# Patient Record
Sex: MALE | Race: WHITE | NOT HISPANIC OR LATINO | Employment: FULL TIME | URBAN - METROPOLITAN AREA
[De-identification: names, ages, dates, MRNs, and addresses within clinical notes are randomized per-mention and may not be internally consistent; named-entity substitution may affect disease eponyms.]

---

## 2018-03-15 ENCOUNTER — APPOINTMENT (OUTPATIENT)
Dept: RADIOLOGY | Facility: MEDICAL CENTER | Age: 48
End: 2018-03-15
Attending: EMERGENCY MEDICINE
Payer: MEDICAID

## 2018-03-15 ENCOUNTER — HOSPITAL ENCOUNTER (EMERGENCY)
Facility: MEDICAL CENTER | Age: 48
End: 2018-03-15
Attending: EMERGENCY MEDICINE
Payer: MEDICAID

## 2018-03-15 VITALS
OXYGEN SATURATION: 94 % | SYSTOLIC BLOOD PRESSURE: 128 MMHG | HEIGHT: 75 IN | BODY MASS INDEX: 22.38 KG/M2 | WEIGHT: 180 LBS | HEART RATE: 65 BPM | TEMPERATURE: 97.3 F | RESPIRATION RATE: 16 BRPM | DIASTOLIC BLOOD PRESSURE: 87 MMHG
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
## 2018-03-15 DIAGNOSIS — R56.9 SEIZURE (HCC): ICD-10-CM

## 2018-03-15 DIAGNOSIS — S20.229A CONTUSION OF BACK, UNSPECIFIED LATERALITY, INITIAL ENCOUNTER: ICD-10-CM

## 2018-03-15 LAB
ABO GROUP BLD: NORMAL
ALBUMIN SERPL BCP-MCNC: 4.1 G/DL (ref 3.2–4.9)
ALBUMIN/GLOB SERPL: 1.6 G/DL
ALP SERPL-CCNC: 51 U/L (ref 30–99)
ALT SERPL-CCNC: 9 U/L (ref 2–50)
ANION GAP SERPL CALC-SCNC: 9 MMOL/L (ref 0–11.9)
APTT PPP: 23.2 SEC (ref 24.7–36)
AST SERPL-CCNC: 13 U/L (ref 12–45)
BILIRUB SERPL-MCNC: 0.4 MG/DL (ref 0.1–1.5)
BLD GP AB SCN SERPL QL: NORMAL
BUN SERPL-MCNC: 11 MG/DL (ref 8–22)
CALCIUM SERPL-MCNC: 9.1 MG/DL (ref 8.5–10.5)
CHLORIDE SERPL-SCNC: 106 MMOL/L (ref 96–112)
CO2 SERPL-SCNC: 22 MMOL/L (ref 20–33)
CREAT SERPL-MCNC: 1.12 MG/DL (ref 0.5–1.4)
ERYTHROCYTE [DISTWIDTH] IN BLOOD BY AUTOMATED COUNT: 48.1 FL (ref 35.9–50)
ETHANOL BLD-MCNC: 0.02 G/DL
GLOBULIN SER CALC-MCNC: 2.6 G/DL (ref 1.9–3.5)
GLUCOSE SERPL-MCNC: 153 MG/DL (ref 65–99)
HCT VFR BLD AUTO: 46.6 % (ref 42–52)
HGB BLD-MCNC: 15.7 G/DL (ref 14–18)
INR PPP: 0.92 (ref 0.87–1.13)
MCH RBC QN AUTO: 33.1 PG (ref 27–33)
MCHC RBC AUTO-ENTMCNC: 33.7 G/DL (ref 33.7–35.3)
MCV RBC AUTO: 98.3 FL (ref 81.4–97.8)
PLATELET # BLD AUTO: 247 K/UL (ref 164–446)
PMV BLD AUTO: 9.2 FL (ref 9–12.9)
POTASSIUM SERPL-SCNC: 3.7 MMOL/L (ref 3.6–5.5)
PROT SERPL-MCNC: 6.7 G/DL (ref 6–8.2)
PROTHROMBIN TIME: 12.1 SEC (ref 12–14.6)
RBC # BLD AUTO: 4.74 M/UL (ref 4.7–6.1)
RH BLD: NORMAL
SODIUM SERPL-SCNC: 137 MMOL/L (ref 135–145)
WBC # BLD AUTO: 9.2 K/UL (ref 4.8–10.8)

## 2018-03-15 PROCEDURE — 85730 THROMBOPLASTIN TIME PARTIAL: CPT

## 2018-03-15 PROCEDURE — 96374 THER/PROPH/DIAG INJ IV PUSH: CPT | Mod: XU

## 2018-03-15 PROCEDURE — 70551 MRI BRAIN STEM W/O DYE: CPT

## 2018-03-15 PROCEDURE — 85027 COMPLETE CBC AUTOMATED: CPT

## 2018-03-15 PROCEDURE — 71260 CT THORAX DX C+: CPT

## 2018-03-15 PROCEDURE — 80307 DRUG TEST PRSMV CHEM ANLYZR: CPT

## 2018-03-15 PROCEDURE — 72131 CT LUMBAR SPINE W/O DYE: CPT

## 2018-03-15 PROCEDURE — 93005 ELECTROCARDIOGRAM TRACING: CPT | Performed by: EMERGENCY MEDICINE

## 2018-03-15 PROCEDURE — 99285 EMERGENCY DEPT VISIT HI MDM: CPT

## 2018-03-15 PROCEDURE — 700111 HCHG RX REV CODE 636 W/ 250 OVERRIDE (IP): Performed by: EMERGENCY MEDICINE

## 2018-03-15 PROCEDURE — A9270 NON-COVERED ITEM OR SERVICE: HCPCS | Performed by: EMERGENCY MEDICINE

## 2018-03-15 PROCEDURE — 86900 BLOOD TYPING SEROLOGIC ABO: CPT

## 2018-03-15 PROCEDURE — 95951 EEG: CPT | Mod: 52

## 2018-03-15 PROCEDURE — 700102 HCHG RX REV CODE 250 W/ 637 OVERRIDE(OP): Performed by: EMERGENCY MEDICINE

## 2018-03-15 PROCEDURE — 72125 CT NECK SPINE W/O DYE: CPT

## 2018-03-15 PROCEDURE — 85610 PROTHROMBIN TIME: CPT

## 2018-03-15 PROCEDURE — 80053 COMPREHEN METABOLIC PANEL: CPT

## 2018-03-15 PROCEDURE — 700117 HCHG RX CONTRAST REV CODE 255: Performed by: EMERGENCY MEDICINE

## 2018-03-15 PROCEDURE — 86901 BLOOD TYPING SEROLOGIC RH(D): CPT

## 2018-03-15 PROCEDURE — 70450 CT HEAD/BRAIN W/O DYE: CPT

## 2018-03-15 PROCEDURE — 72128 CT CHEST SPINE W/O DYE: CPT

## 2018-03-15 PROCEDURE — 86850 RBC ANTIBODY SCREEN: CPT

## 2018-03-15 PROCEDURE — 305948 HCHG GREEN TRAUMA ACT PRE-NOTIFY NO CC

## 2018-03-15 RX ORDER — QUETIAPINE FUMARATE 200 MG/1
200 TABLET, FILM COATED ORAL
Status: ON HOLD | COMMUNITY
End: 2018-06-05

## 2018-03-15 RX ORDER — PHENYTOIN SODIUM 100 MG/1
300 CAPSULE, EXTENDED RELEASE ORAL DAILY
Qty: 90 CAP | Refills: 3 | Status: ON HOLD | OUTPATIENT
Start: 2018-03-15 | End: 2018-06-05

## 2018-03-15 RX ORDER — MORPHINE SULFATE 4 MG/ML
4 INJECTION, SOLUTION INTRAMUSCULAR; INTRAVENOUS ONCE
Status: COMPLETED | OUTPATIENT
Start: 2018-03-15 | End: 2018-03-15

## 2018-03-15 RX ORDER — LEVETIRACETAM 500 MG/1
500 TABLET ORAL 2 TIMES DAILY
Qty: 60 TAB | Refills: 3 | Status: ON HOLD | OUTPATIENT
Start: 2018-03-15 | End: 2018-06-05

## 2018-03-15 RX ORDER — NALTREXONE HYDROCHLORIDE 50 MG/1
50 TABLET, FILM COATED ORAL DAILY
Status: ON HOLD | COMMUNITY
End: 2018-06-05

## 2018-03-15 RX ORDER — GABAPENTIN 300 MG/1
300 CAPSULE ORAL 4 TIMES DAILY
Status: ON HOLD | COMMUNITY
End: 2018-06-05

## 2018-03-15 RX ORDER — LEVETIRACETAM 500 MG/1
500 TABLET ORAL ONCE
Status: COMPLETED | OUTPATIENT
Start: 2018-03-15 | End: 2018-03-15

## 2018-03-15 RX ADMIN — LEVETIRACETAM 500 MG: 500 TABLET, FILM COATED ORAL at 16:08

## 2018-03-15 RX ADMIN — IOHEXOL 100 ML: 350 INJECTION, SOLUTION INTRAVENOUS at 11:45

## 2018-03-15 RX ADMIN — MORPHINE SULFATE 4 MG: 4 INJECTION INTRAVENOUS at 15:21

## 2018-03-15 NOTE — DISCHARGE INSTRUCTIONS
Seizures     Return for back to back seizures or seizure longer than 10 minutes.  Return for ill appearance, fever or injury.  Followup with neurology.  Do not drive for 3 months until cleared by neurology.  During a seizure ease the patient to the floor, move objects away from him to prevent injury, put nothing in his mouth. Take ibuprofen and Tylenol for back pain follow-up with [facility] neurology and a primary physician through your insurance or at the [facility] clinic. Take Keppra if you can afford it or your insurance covers it. Otherwise take Dilantin. Do not take both.    You had a borderline or high normal blood pressure reading today.  This does not necessarily mean you have hypertension.  Please followup with your/a primary physician for comprehensive blood pressure evaluation and yearly fasting cholesterol assessment.  BP Readings from Last 3 Encounters:   03/15/18 128/87         You had a seizure.  About 2% of the population will have a seizure problem during their life.  Sometimes the cause for the seizure is not known.  Seizures are most often associated with one of these problems:  Ø Epilepsy.  Ø Not taking your seizure medicine.  Ø Alcohol and drug abuse.  Ø Head injury, strokes, tumors, and brain surgery.  Ø High fever and infections.  Ø Low blood sugar.     Evaluating a new seizure disorder may require having a brain scan or an EEG (brain wave test). If you have been given a seizure medicine, it is very important that you take it as prescribed.  Not taking these medicines as directed is the most common cause of seizures. Blood tests are often used to be sure you are taking the proper dose.      Seizures cause many different symptoms, from convulsions to brief blackouts. Please do not ride a bike, drive a car, go swimming, climb in high or dangerous places such as ladders or roofs, or operate any dangerous equipment until you have your doctor's permission.  If you hold a 's license, state law may  require that a report be made to the motor vehicles department.  You should wear an emergency medical identification bracelet with information about your seizures. If you have any warning that a seizure may occur, lie down in a safe place to protect yourself.     Teach your family and friends what to do if you have any further seizures. They should stay calm and try to keep you from falling on hard or sharp objects. It is best not to try to restrain a seizing person or to force anything into their mouth. Do not try to open clenched jaws.  When the seizure is over, the person should be rolled on their side to help drain any vomit or secretions from the mouth.  After a seizure the person may be confused or drowsy for several minutes and they can be reassured. An ambulance should be called if the seizure lasted more than 5 minutes or if confusion remains for more than 30 minutes.  Call your caregiver or the emergency department for further instructions.     Do not drive until cleared by your caregiver or neurologist!     Document Released: 01/25/2006  Document Re-Released: 10/14/2008  [employer]® Patient Information ©2009 [employer].

## 2018-03-15 NOTE — LETTER
[facility], EMERGENCY DEPT  [facility] [address]  [phone]     March 15, 2018    Patient: Andre Briceño   YOB: 1970   Date of Visit: 3/12/2018       To Whom It May Concern:    Andre Briceño was seen and treated in our department on 3/12/2018. He should be excused from work until 3/20/18.    Sincerely,     Aleajndro Martin R.N.

## 2018-03-15 NOTE — ED NOTES
Pt to MRI in [facility] with transport tech, pt sits at edge of bed voids 900mL clear yellow urine prior to leaving

## 2018-03-15 NOTE — ED NOTES
Pt's friends arrive at bedside, pt talking with wife on phone, pt's face cleaned with soap and water, eyes irrigated with water.
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
## 2018-03-15 NOTE — ED NOTES
PT gave permission to give info to mom and sister.  Mom and sister updated.  PT awake and alert at this time
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
## 2018-03-17 LAB — EKG IMPRESSION: NORMAL

## 2018-06-04 ENCOUNTER — APPOINTMENT (OUTPATIENT)
Dept: RADIOLOGY | Facility: MEDICAL CENTER | Age: 48
DRG: 137 | End: 2018-06-04
Attending: EMERGENCY MEDICINE
Payer: MEDICAID

## 2018-06-04 ENCOUNTER — HOSPITAL ENCOUNTER (INPATIENT)
Facility: MEDICAL CENTER | Age: 48
LOS: 2 days | DRG: 137 | End: 2018-06-06
Attending: EMERGENCY MEDICINE | Admitting: INTERNAL MEDICINE
Payer: MEDICAID

## 2018-06-04 DIAGNOSIS — K04.7 INFECTED DENTAL CARIES: ICD-10-CM

## 2018-06-04 DIAGNOSIS — K02.9 INFECTED DENTAL CARIES: ICD-10-CM

## 2018-06-04 LAB
ALBUMIN SERPL BCP-MCNC: 4.7 G/DL (ref 3.2–4.9)
ALBUMIN/GLOB SERPL: 1.5 G/DL
ALP SERPL-CCNC: 68 U/L (ref 30–99)
ALT SERPL-CCNC: 13 U/L (ref 2–50)
ANION GAP SERPL CALC-SCNC: 7 MMOL/L (ref 0–11.9)
APTT PPP: 28.5 SEC (ref 24.7–36)
AST SERPL-CCNC: 13 U/L (ref 12–45)
BASOPHILS # BLD AUTO: 0.3 % (ref 0–1.8)
BASOPHILS # BLD: 0.02 K/UL (ref 0–0.12)
BILIRUB SERPL-MCNC: 0.2 MG/DL (ref 0.1–1.5)
BUN SERPL-MCNC: 14 MG/DL (ref 8–22)
CALCIUM SERPL-MCNC: 9.4 MG/DL (ref 8.5–10.5)
CHLORIDE SERPL-SCNC: 105 MMOL/L (ref 96–112)
CO2 SERPL-SCNC: 28 MMOL/L (ref 20–33)
CREAT SERPL-MCNC: 0.99 MG/DL (ref 0.5–1.4)
EOSINOPHIL # BLD AUTO: 0.02 K/UL (ref 0–0.51)
EOSINOPHIL NFR BLD: 0.3 % (ref 0–6.9)
ERYTHROCYTE [DISTWIDTH] IN BLOOD BY AUTOMATED COUNT: 41.9 FL (ref 35.9–50)
GLOBULIN SER CALC-MCNC: 3.1 G/DL (ref 1.9–3.5)
GLUCOSE SERPL-MCNC: 104 MG/DL (ref 65–99)
HCT VFR BLD AUTO: 43.2 % (ref 42–52)
HGB BLD-MCNC: 14.9 G/DL (ref 14–18)
IMM GRANULOCYTES # BLD AUTO: 0.02 K/UL (ref 0–0.11)
IMM GRANULOCYTES NFR BLD AUTO: 0.3 % (ref 0–0.9)
INR PPP: 0.99 (ref 0.87–1.13)
LYMPHOCYTES # BLD AUTO: 1.61 K/UL (ref 1–4.8)
LYMPHOCYTES NFR BLD: 26.6 % (ref 22–41)
MCH RBC QN AUTO: 32.5 PG (ref 27–33)
MCHC RBC AUTO-ENTMCNC: 34.5 G/DL (ref 33.7–35.3)
MCV RBC AUTO: 94.3 FL (ref 81.4–97.8)
MONOCYTES # BLD AUTO: 0.57 K/UL (ref 0–0.85)
MONOCYTES NFR BLD AUTO: 9.4 % (ref 0–13.4)
NEUTROPHILS # BLD AUTO: 3.81 K/UL (ref 1.82–7.42)
NEUTROPHILS NFR BLD: 63.1 % (ref 44–72)
NRBC # BLD AUTO: 0 K/UL
NRBC BLD-RTO: 0 /100 WBC
PLATELET # BLD AUTO: 264 K/UL (ref 164–446)
PMV BLD AUTO: 9.3 FL (ref 9–12.9)
POTASSIUM SERPL-SCNC: 3.9 MMOL/L (ref 3.6–5.5)
PROT SERPL-MCNC: 7.8 G/DL (ref 6–8.2)
PROTHROMBIN TIME: 12.8 SEC (ref 12–14.6)
RBC # BLD AUTO: 4.58 M/UL (ref 4.7–6.1)
SODIUM SERPL-SCNC: 140 MMOL/L (ref 135–145)
WBC # BLD AUTO: 6.1 K/UL (ref 4.8–10.8)

## 2018-06-04 PROCEDURE — 85610 PROTHROMBIN TIME: CPT

## 2018-06-04 PROCEDURE — 700105 HCHG RX REV CODE 258: Performed by: EMERGENCY MEDICINE

## 2018-06-04 PROCEDURE — 85025 COMPLETE CBC W/AUTO DIFF WBC: CPT

## 2018-06-04 PROCEDURE — 85730 THROMBOPLASTIN TIME PARTIAL: CPT

## 2018-06-04 PROCEDURE — 99285 EMERGENCY DEPT VISIT HI MDM: CPT

## 2018-06-04 PROCEDURE — 70355 PANORAMIC X-RAY OF JAWS: CPT

## 2018-06-04 PROCEDURE — 96365 THER/PROPH/DIAG IV INF INIT: CPT

## 2018-06-04 PROCEDURE — 770006 HCHG ROOM/CARE - MED/SURG/GYN SEMI*

## 2018-06-04 PROCEDURE — 36415 COLL VENOUS BLD VENIPUNCTURE: CPT

## 2018-06-04 PROCEDURE — 80053 COMPREHEN METABOLIC PANEL: CPT

## 2018-06-04 PROCEDURE — 700111 HCHG RX REV CODE 636 W/ 250 OVERRIDE (IP): Performed by: EMERGENCY MEDICINE

## 2018-06-04 RX ORDER — QUETIAPINE FUMARATE 100 MG/1
200 TABLET, FILM COATED ORAL
Status: DISCONTINUED | OUTPATIENT
Start: 2018-06-04 | End: 2018-06-06 | Stop reason: HOSPADM

## 2018-06-04 RX ORDER — SODIUM CHLORIDE 9 MG/ML
INJECTION, SOLUTION INTRAVENOUS CONTINUOUS
Status: DISPENSED | OUTPATIENT
Start: 2018-06-04 | End: 2018-06-05

## 2018-06-04 RX ORDER — GABAPENTIN 100 MG/1
100 CAPSULE ORAL
Status: DISCONTINUED | OUTPATIENT
Start: 2018-06-05 | End: 2018-06-06 | Stop reason: HOSPADM

## 2018-06-04 RX ORDER — PHENYTOIN SODIUM 100 MG/1
300 CAPSULE, EXTENDED RELEASE ORAL DAILY
Status: DISCONTINUED | OUTPATIENT
Start: 2018-06-05 | End: 2018-06-06 | Stop reason: HOSPADM

## 2018-06-04 RX ORDER — POLYETHYLENE GLYCOL 3350 17 G/17G
1 POWDER, FOR SOLUTION ORAL
Status: DISCONTINUED | OUTPATIENT
Start: 2018-06-04 | End: 2018-06-06 | Stop reason: HOSPADM

## 2018-06-04 RX ORDER — QUETIAPINE FUMARATE 25 MG/1
50 TABLET, FILM COATED ORAL
Status: DISCONTINUED | OUTPATIENT
Start: 2018-06-05 | End: 2018-06-06 | Stop reason: HOSPADM

## 2018-06-04 RX ORDER — AMOXICILLIN 250 MG
2 CAPSULE ORAL 2 TIMES DAILY
Status: DISCONTINUED | OUTPATIENT
Start: 2018-06-04 | End: 2018-06-06 | Stop reason: HOSPADM

## 2018-06-04 RX ORDER — QUETIAPINE FUMARATE 25 MG/1
50 TABLET, FILM COATED ORAL EVERY MORNING
Status: DISCONTINUED | OUTPATIENT
Start: 2018-06-05 | End: 2018-06-06 | Stop reason: HOSPADM

## 2018-06-04 RX ORDER — IBUPROFEN 800 MG/1
800 TABLET ORAL EVERY 8 HOURS PRN
Status: DISCONTINUED | OUTPATIENT
Start: 2018-06-04 | End: 2018-06-06 | Stop reason: HOSPADM

## 2018-06-04 RX ORDER — BISACODYL 10 MG
10 SUPPOSITORY, RECTAL RECTAL
Status: DISCONTINUED | OUTPATIENT
Start: 2018-06-04 | End: 2018-06-06 | Stop reason: HOSPADM

## 2018-06-04 RX ORDER — IBUPROFEN 800 MG/1
800 TABLET ORAL EVERY 8 HOURS PRN
Status: ON HOLD | COMMUNITY
End: 2018-06-05

## 2018-06-04 RX ORDER — AMOXICILLIN 500 MG/1
500 CAPSULE ORAL 3 TIMES DAILY
Status: ON HOLD | COMMUNITY
End: 2018-06-05

## 2018-06-04 RX ORDER — NALTREXONE HYDROCHLORIDE 50 MG/1
50 TABLET, FILM COATED ORAL DAILY
Status: DISCONTINUED | OUTPATIENT
Start: 2018-06-05 | End: 2018-06-06 | Stop reason: HOSPADM

## 2018-06-04 RX ADMIN — SODIUM CHLORIDE 3 G: 900 INJECTION INTRAVENOUS at 22:05

## 2018-06-04 ASSESSMENT — LIFESTYLE VARIABLES: DO YOU DRINK ALCOHOL: NO

## 2018-06-04 ASSESSMENT — PAIN SCALES - GENERAL: PAINLEVEL_OUTOF10: 2

## 2018-06-05 PROBLEM — G40.909 SEIZURE DISORDER (HCC): Status: ACTIVE | Noted: 2018-06-05

## 2018-06-05 PROBLEM — F99 PSYCHIATRIC DISORDER: Status: ACTIVE | Noted: 2018-06-05

## 2018-06-05 PROBLEM — K04.7 DENTAL ABSCESS: Status: ACTIVE | Noted: 2018-06-05

## 2018-06-05 LAB
ALBUMIN SERPL BCP-MCNC: 3.9 G/DL (ref 3.2–4.9)
ALBUMIN/GLOB SERPL: 1.5 G/DL
ALP SERPL-CCNC: 59 U/L (ref 30–99)
ALT SERPL-CCNC: 11 U/L (ref 2–50)
ANION GAP SERPL CALC-SCNC: 7 MMOL/L (ref 0–11.9)
AST SERPL-CCNC: 12 U/L (ref 12–45)
BASOPHILS # BLD AUTO: 0.4 % (ref 0–1.8)
BASOPHILS # BLD: 0.02 K/UL (ref 0–0.12)
BILIRUB SERPL-MCNC: 0.2 MG/DL (ref 0.1–1.5)
BUN SERPL-MCNC: 14 MG/DL (ref 8–22)
CALCIUM SERPL-MCNC: 9 MG/DL (ref 8.5–10.5)
CHLORIDE SERPL-SCNC: 105 MMOL/L (ref 96–112)
CO2 SERPL-SCNC: 27 MMOL/L (ref 20–33)
CREAT SERPL-MCNC: 0.86 MG/DL (ref 0.5–1.4)
EOSINOPHIL # BLD AUTO: 0.06 K/UL (ref 0–0.51)
EOSINOPHIL NFR BLD: 1.2 % (ref 0–6.9)
ERYTHROCYTE [DISTWIDTH] IN BLOOD BY AUTOMATED COUNT: 41.1 FL (ref 35.9–50)
GLOBULIN SER CALC-MCNC: 2.6 G/DL (ref 1.9–3.5)
GLUCOSE SERPL-MCNC: 107 MG/DL (ref 65–99)
HCT VFR BLD AUTO: 37.7 % (ref 42–52)
HGB BLD-MCNC: 13.3 G/DL (ref 14–18)
IMM GRANULOCYTES # BLD AUTO: 0.01 K/UL (ref 0–0.11)
IMM GRANULOCYTES NFR BLD AUTO: 0.2 % (ref 0–0.9)
LYMPHOCYTES # BLD AUTO: 1.82 K/UL (ref 1–4.8)
LYMPHOCYTES NFR BLD: 35.8 % (ref 22–41)
MCH RBC QN AUTO: 33 PG (ref 27–33)
MCHC RBC AUTO-ENTMCNC: 35.3 G/DL (ref 33.7–35.3)
MCV RBC AUTO: 93.5 FL (ref 81.4–97.8)
MONOCYTES # BLD AUTO: 0.75 K/UL (ref 0–0.85)
MONOCYTES NFR BLD AUTO: 14.8 % (ref 0–13.4)
NEUTROPHILS # BLD AUTO: 2.42 K/UL (ref 1.82–7.42)
NEUTROPHILS NFR BLD: 47.6 % (ref 44–72)
NRBC # BLD AUTO: 0 K/UL
NRBC BLD-RTO: 0 /100 WBC
PLATELET # BLD AUTO: 235 K/UL (ref 164–446)
PMV BLD AUTO: 9.4 FL (ref 9–12.9)
POTASSIUM SERPL-SCNC: 4 MMOL/L (ref 3.6–5.5)
PROT SERPL-MCNC: 6.5 G/DL (ref 6–8.2)
RBC # BLD AUTO: 4.03 M/UL (ref 4.7–6.1)
SODIUM SERPL-SCNC: 139 MMOL/L (ref 135–145)
WBC # BLD AUTO: 5.1 K/UL (ref 4.8–10.8)

## 2018-06-05 PROCEDURE — 0CDXXZ1 EXTRACTION OF LOWER TOOTH, MULTIPLE, EXTERNAL APPROACH: ICD-10-PCS | Performed by: DENTIST

## 2018-06-05 PROCEDURE — 87040 BLOOD CULTURE FOR BACTERIA: CPT

## 2018-06-05 PROCEDURE — 700101 HCHG RX REV CODE 250

## 2018-06-05 PROCEDURE — 700111 HCHG RX REV CODE 636 W/ 250 OVERRIDE (IP): Performed by: STUDENT IN AN ORGANIZED HEALTH CARE EDUCATION/TRAINING PROGRAM

## 2018-06-05 PROCEDURE — 500380 HCHG DRAIN, PENROSE 1/4X12: Performed by: DENTIST

## 2018-06-05 PROCEDURE — 160038 HCHG SURGERY MINUTES - EA ADDL 1 MIN LEVEL 2: Performed by: DENTIST

## 2018-06-05 PROCEDURE — A6403 STERILE GAUZE>16 <= 48 SQ IN: HCPCS | Performed by: DENTIST

## 2018-06-05 PROCEDURE — 700111 HCHG RX REV CODE 636 W/ 250 OVERRIDE (IP)

## 2018-06-05 PROCEDURE — 160027 HCHG SURGERY MINUTES - 1ST 30 MINS LEVEL 2: Performed by: DENTIST

## 2018-06-05 PROCEDURE — 36415 COLL VENOUS BLD VENIPUNCTURE: CPT

## 2018-06-05 PROCEDURE — 160035 HCHG PACU - 1ST 60 MINS PHASE I: Performed by: DENTIST

## 2018-06-05 PROCEDURE — 0W930ZZ DRAINAGE OF ORAL CAVITY AND THROAT, OPEN APPROACH: ICD-10-PCS | Performed by: DENTIST

## 2018-06-05 PROCEDURE — 700105 HCHG RX REV CODE 258

## 2018-06-05 PROCEDURE — 85025 COMPLETE CBC W/AUTO DIFF WBC: CPT

## 2018-06-05 PROCEDURE — 700105 HCHG RX REV CODE 258: Performed by: STUDENT IN AN ORGANIZED HEALTH CARE EDUCATION/TRAINING PROGRAM

## 2018-06-05 PROCEDURE — 500126 HCHG BOVIE, NEEDLE TIP: Performed by: DENTIST

## 2018-06-05 PROCEDURE — 500754 HCHG JAW BRA: Performed by: DENTIST

## 2018-06-05 PROCEDURE — 80053 COMPREHEN METABOLIC PANEL: CPT

## 2018-06-05 PROCEDURE — 500445 HCHG HEMOSTAT, SURGICEL 4X8: Performed by: DENTIST

## 2018-06-05 PROCEDURE — 160009 HCHG ANES TIME/MIN: Performed by: DENTIST

## 2018-06-05 PROCEDURE — 770006 HCHG ROOM/CARE - MED/SURG/GYN SEMI*

## 2018-06-05 PROCEDURE — 160002 HCHG RECOVERY MINUTES (STAT): Performed by: DENTIST

## 2018-06-05 PROCEDURE — 501411 HCHG SPONGE, BABY LAP W/O RINGS: Performed by: DENTIST

## 2018-06-05 PROCEDURE — 500423 HCHG DRESSING, ABD COMBINE: Performed by: DENTIST

## 2018-06-05 PROCEDURE — A9270 NON-COVERED ITEM OR SERVICE: HCPCS | Performed by: STUDENT IN AN ORGANIZED HEALTH CARE EDUCATION/TRAINING PROGRAM

## 2018-06-05 PROCEDURE — 160048 HCHG OR STATISTICAL LEVEL 1-5: Performed by: DENTIST

## 2018-06-05 PROCEDURE — 500440 HCHG DRESSING, STERILE ROLL (KERLIX): Performed by: DENTIST

## 2018-06-05 PROCEDURE — 700102 HCHG RX REV CODE 250 W/ 637 OVERRIDE(OP): Performed by: STUDENT IN AN ORGANIZED HEALTH CARE EDUCATION/TRAINING PROGRAM

## 2018-06-05 PROCEDURE — 99223 1ST HOSP IP/OBS HIGH 75: CPT | Mod: GC | Performed by: INTERNAL MEDICINE

## 2018-06-05 PROCEDURE — 501838 HCHG SUTURE GENERAL: Performed by: DENTIST

## 2018-06-05 RX ORDER — LIDOCAINE HYDROCHLORIDE AND EPINEPHRINE BITARTRATE 20; .01 MG/ML; MG/ML
INJECTION, SOLUTION SUBCUTANEOUS
Status: DISCONTINUED | OUTPATIENT
Start: 2018-06-05 | End: 2018-06-05 | Stop reason: HOSPADM

## 2018-06-05 RX ORDER — QUETIAPINE FUMARATE 400 MG/1
400 TABLET, FILM COATED ORAL EVERY EVENING
COMMUNITY

## 2018-06-05 RX ORDER — QUETIAPINE FUMARATE 50 MG/1
50 TABLET, FILM COATED ORAL 2 TIMES DAILY
COMMUNITY

## 2018-06-05 RX ORDER — AMOXICILLIN 500 MG/1
500 CAPSULE ORAL 2 TIMES DAILY
Status: ON HOLD | COMMUNITY
Start: 2018-05-16 | End: 2018-06-06

## 2018-06-05 RX ORDER — PHENYTOIN SODIUM 100 MG/1
300 CAPSULE, EXTENDED RELEASE ORAL EVERY EVENING
COMMUNITY
End: 2019-07-22

## 2018-06-05 RX ORDER — LEVETIRACETAM 500 MG/1
500 TABLET ORAL 2 TIMES DAILY
Status: DISCONTINUED | OUTPATIENT
Start: 2018-06-05 | End: 2018-06-05

## 2018-06-05 RX ORDER — GABAPENTIN 300 MG/1
300 CAPSULE ORAL 4 TIMES DAILY
COMMUNITY
End: 2019-01-21 | Stop reason: SDUPTHER

## 2018-06-05 RX ORDER — IBUPROFEN 800 MG/1
800 TABLET ORAL 2 TIMES DAILY PRN
Status: ON HOLD | COMMUNITY
End: 2018-11-26

## 2018-06-05 RX ORDER — NALTREXONE HYDROCHLORIDE 50 MG/1
50 TABLET, FILM COATED ORAL EVERY MORNING
COMMUNITY

## 2018-06-05 RX ORDER — SODIUM CHLORIDE 9 MG/ML
INJECTION, SOLUTION INTRAVENOUS
Status: COMPLETED
Start: 2018-06-05 | End: 2018-06-05

## 2018-06-05 RX ADMIN — SODIUM CHLORIDE: 9 INJECTION, SOLUTION INTRAVENOUS at 00:13

## 2018-06-05 RX ADMIN — AMPICILLIN SODIUM AND SULBACTAM SODIUM 3 G: 2; 1 INJECTION, POWDER, FOR SOLUTION INTRAMUSCULAR; INTRAVENOUS at 21:00

## 2018-06-05 RX ADMIN — GABAPENTIN 100 MG: 100 CAPSULE ORAL at 16:10

## 2018-06-05 RX ADMIN — SODIUM CHLORIDE: 9 INJECTION, SOLUTION INTRAVENOUS at 06:43

## 2018-06-05 RX ADMIN — AMPICILLIN SODIUM AND SULBACTAM SODIUM 3 G: 2; 1 INJECTION, POWDER, FOR SOLUTION INTRAMUSCULAR; INTRAVENOUS at 10:08

## 2018-06-05 RX ADMIN — QUETIAPINE FUMARATE 50 MG: 25 TABLET ORAL at 12:34

## 2018-06-05 RX ADMIN — IBUPROFEN 800 MG: 800 TABLET, FILM COATED ORAL at 19:36

## 2018-06-05 RX ADMIN — GABAPENTIN 100 MG: 100 CAPSULE ORAL at 19:36

## 2018-06-05 RX ADMIN — SODIUM CHLORIDE 500 ML: 9 INJECTION, SOLUTION INTRAVENOUS at 20:53

## 2018-06-05 RX ADMIN — AMPICILLIN SODIUM AND SULBACTAM SODIUM 3 G: 2; 1 INJECTION, POWDER, FOR SOLUTION INTRAMUSCULAR; INTRAVENOUS at 17:01

## 2018-06-05 RX ADMIN — IBUPROFEN 800 MG: 800 TABLET, FILM COATED ORAL at 00:46

## 2018-06-05 RX ADMIN — GABAPENTIN 100 MG: 100 CAPSULE ORAL at 12:34

## 2018-06-05 RX ADMIN — QUETIAPINE FUMARATE 50 MG: 25 TABLET ORAL at 07:32

## 2018-06-05 RX ADMIN — QUETIAPINE FUMARATE 200 MG: 100 TABLET ORAL at 19:36

## 2018-06-05 RX ADMIN — IBUPROFEN 800 MG: 800 TABLET, FILM COATED ORAL at 09:44

## 2018-06-05 RX ADMIN — GABAPENTIN 100 MG: 100 CAPSULE ORAL at 07:32

## 2018-06-05 RX ADMIN — NALTREXONE HYDROCHLORIDE 50 MG: 50 TABLET, FILM COATED ORAL at 12:34

## 2018-06-05 RX ADMIN — PHENYTOIN SODIUM 300 MG: 100 CAPSULE ORAL at 07:31

## 2018-06-05 RX ADMIN — AMPICILLIN SODIUM AND SULBACTAM SODIUM 3 G: 2; 1 INJECTION, POWDER, FOR SOLUTION INTRAMUSCULAR; INTRAVENOUS at 03:51

## 2018-06-05 RX ADMIN — QUETIAPINE FUMARATE 200 MG: 100 TABLET ORAL at 00:46

## 2018-06-05 ASSESSMENT — ENCOUNTER SYMPTOMS
DOUBLE VISION: 0
HEMOPTYSIS: 0
EYE PAIN: 0
BACK PAIN: 0
SINUS PAIN: 0
BLOOD IN STOOL: 0
SENSORY CHANGE: 0
VOMITING: 0
SPUTUM PRODUCTION: 0
WHEEZING: 0
DEPRESSION: 0
ABDOMINAL PAIN: 0
DIAPHORESIS: 0
FLANK PAIN: 0
TREMORS: 0
HALLUCINATIONS: 0
HEARTBURN: 0
BRUISES/BLEEDS EASILY: 0
TINGLING: 0
WEIGHT LOSS: 0
HEADACHES: 0
EYE DISCHARGE: 0
SORE THROAT: 0
NERVOUS/ANXIOUS: 0
MEMORY LOSS: 0
PALPITATIONS: 0
NECK PAIN: 0
MYALGIAS: 0
CONSTIPATION: 0
DIARRHEA: 0
BLURRED VISION: 0
STRIDOR: 0
CHILLS: 0
FEVER: 0
DIZZINESS: 0
COUGH: 0
NAUSEA: 0

## 2018-06-05 ASSESSMENT — LIFESTYLE VARIABLES: EVER_SMOKED: YES

## 2018-06-05 ASSESSMENT — COPD QUESTIONNAIRES
IN THE PAST 12 MONTHS DO YOU DO LESS THAN YOU USED TO BECAUSE OF YOUR BREATHING PROBLEMS: DISAGREE/UNSURE
COPD SCREENING SCORE: 2
DURING THE PAST 4 WEEKS HOW MUCH DID YOU FEEL SHORT OF BREATH: NONE/LITTLE OF THE TIME
DO YOU EVER COUGH UP ANY MUCUS OR PHLEGM?: NO/ONLY WITH OCCASIONAL COLDS OR INFECTIONS
HAVE YOU SMOKED AT LEAST 100 CIGARETTES IN YOUR ENTIRE LIFE: YES

## 2018-06-05 ASSESSMENT — PAIN SCALES - GENERAL
PAINLEVEL_OUTOF10: 0
PAINLEVEL_OUTOF10: 4
PAINLEVEL_OUTOF10: 4
PAINLEVEL_OUTOF10: 6
PAINLEVEL_OUTOF10: 0
PAINLEVEL_OUTOF10: 0
PAINLEVEL_OUTOF10: 3
PAINLEVEL_OUTOF10: 0
PAINLEVEL_OUTOF10: 2
PAINLEVEL_OUTOF10: 5

## 2018-06-05 ASSESSMENT — PATIENT HEALTH QUESTIONNAIRE - PHQ9
1. LITTLE INTEREST OR PLEASURE IN DOING THINGS: NOT AT ALL
2. FEELING DOWN, DEPRESSED, IRRITABLE, OR HOPELESS: NOT AT ALL
SUM OF ALL RESPONSES TO PHQ9 QUESTIONS 1 AND 2: 0

## 2018-06-05 NOTE — OP REPORT
Operative Note    6/5/2018     Patient ID:   Name:             Andre Briceño     YOB: 1970  Age:                 48 y.o.  male   MRN:               [id number]                                                      PreOp: Diagnosis: Buccal space abscess right side, and abscessed tooth number 21 and 28    PostOp Diagnosis: Same    Procedure(s):  Right buccal space ABSCESS INCISION AND DRAINAGE - Wound Class: Dirty or Infected  DENTAL EXTRACTION(S) - Wound Class: Dirty or Infected    OPERATION:   After a thorough discussion about the risk benefits and alternatives to the procedure the pt gave written and verbal consent for the procedure. Right buccal space ABSCESS INCISION AND DRAINAGE - Wound Class: Dirty or Infected  DENTAL EXTRACTION(S) - Wound Class: Dirty or Infected.  Next, pt was brought back to the OR in supine position. Anesthesia brought the patient to adequate sedation and then intubated the patient via endotracheal technique. Please see their notes for complete details.  Next the patient was prepped and draped as usual for a procedure in the OR.  Local anesthesia was deposited via routine fashion.  Incision was made in the gingiva adjacent teeth 21 and 28 and a full thickness mucoperiosteal flap was laid.  Purulence was drained from this area, right buccal space abscess drained. Teeth numbers 21 and 28 were then surgically removed.  Currette was used to currette out the extraction sockets. There were no complications. Pt tolerated the procedure well.  Pt was discharged to the OR in a stable condition.     Surgeon(s):  Thierry Marinelli D.M.D.,MELIUD    Anesthesiologist/Type of Anesthesia: Anesthesiologist: Florentino Navas M.D.    Surgical Staff:    Specimen: none    Estimated Blood Loss: minimal    FLUIDS: approx 500ml normal saline    Findings: consistent with diagnosis    Dressings: NONE    Drains: NONE    Complications: none

## 2018-06-05 NOTE — PROGRESS NOTES
Med Rec complete per PT at bedside   Allergies Reviewed    Pt has been taking AMOXIL twice a day since 5/16.    Pt takes SEROQUEL three times a day  50 mg every morning   50 mg every afternoon  400 mg every night.

## 2018-06-05 NOTE — OR NURSING
Patient doing well in recovery. Denies pain or nausea. Wife has been updated and is worried patient will be discharge and she cant pick him up. I assured her to call the nurse upstairs and she could let her know. Pt transferred for floor in stable condition.

## 2018-06-05 NOTE — ED PROVIDER NOTES
"ED Provider Note    CHIEF COMPLAINT  Chief Complaint   Patient presents with   • Tooth Abscess     R lower jaw, states \"it's caused by a tooth. I saw a dentist but they said they can't pull the tooth until I get rid of the abscess\"       HPI  Andre Briceño is a 48 y.o. male who presents to the emergency department complaining of pain and swelling in the right lower dentition.  The patient says he has had pain for about 2 weeks and this is gotten a lot worse and he starting to have swelling over the last 4 days.  The patient says that he was started on amoxicillin for dental pain 3 weeks ago by the doctor at the [facility] an [facility].  Despite this the patient's symptoms have worsened and now he has developed this swelling on the right side of the jaw.  He has not been able to obtain dental care.    REVIEW OF SYSTEMS no fever chills no nausea vomiting no difficulty swallowing or breathing.    PAST MEDICAL HISTORY  Past Medical History:   Diagnosis Date   • Bipolar 1 disorder (HCC)    • Bronchitis    • Pain        FAMILY HISTORY  History reviewed. No pertinent family history.    SOCIAL HISTORY  Social History     Social History   • Marital status:      Spouse name: N/A   • Number of children: N/A   • Years of education: N/A     Social History Main Topics   • Smoking status: Former Smoker     Packs/day: 1.00     Years: 20.00     Types: Cigarettes   • Smokeless tobacco: Never Used      Comment: Quit 5/2018   • Alcohol use No      Comment: quit 5/2018, prev.heavy use (3 pints vodka per day)   • Drug use: No      Comment: previous marijuana, quit 5/2018   • Sexual activity: Not on file     Other Topics Concern   • Not on file     Social History Narrative   • No narrative on file       SURGICAL HISTORY  Past Surgical History:   Procedure Laterality Date   • MENISCUS REPAIR  12/10/2014    Performed by Jose Araya M.D. at SURGERY [facility] ORS   • FINGER ORIF     • HAND SURGERY      left index " "finger, right wrist   • TONSILLECTOMY AND ADENOIDECTOMY     • WRIST ORIF         CURRENT MEDICATIONS  Home Medications     Reviewed by Daysi Saldaña (Pharmacy Tech) on 06/04/18 at 2334  Med List Status: Partial   Medication Last Dose Status   amoxicillin (AMOXIL) 500 MG Cap 6/4/2018 Active   gabapentin (NEURONTIN) 300 MG Cap 6/4/2018 Active   ibuprofen (MOTRIN) 800 MG Tab 6/4/2018 Active   levETIRAcetam (KEPPRA) 500 MG Tab 6/4/2018 Active   naltrexone (DEPADE) 50 MG Tab 6/4/2018 Active   phenytoin ER (DILANTIN) 100 MG Cap 6/4/2018 Active   QUEtiapine (SEROQUEL) 200 MG Tab 6/3/2018 Active                ALLERGIES  No Known Allergies    PHYSICAL EXAM  VITAL SIGNS: /82   Pulse 80   Temp 36.3 °C (97.3 °F) (Temporal)   Resp 16   Ht 1.905 m (6' 3\")   Wt 74.5 kg (164 lb 3.9 oz)   SpO2 98%   BMI 20.53 kg/m²    Oxygen saturation is interpreted as adequate  Constitutional: Awake and nontoxic-appearing  HENT: The patient has generally poor dentition and in the right lower dental quadrant there are some teeth missing and there is an area of very firm induration adjacent to the dentition on the buccal aspect.  There is no real erythema of the face although the face does appear minimally swollen.  This does not extend under the tongue or inferior to the mandible  Eyes: No erythema or discharge  Neck: No meningeal findings  Cardiovascular: Regular heart rate  Lungs: No respiratory distress  Skin: Warm and dry  Musculoskeletal: No acute bony deformity  Neurologic: Awake verbal moving all extremities    Laboratory  CBC shows a normal white blood cell count of 6.1 hemoglobin is adequate at 14.9 complete metabolic panel in the INR normal    Radiology  [id number]   Final Result      No acute fracture or dislocation.            MEDICAL DECISION MAKING and DISPOSITION  In the emergency department an IV was established the patient was given intravenous Unasyn.  I have reviewed the case with Dr. Marinelli who " will provide oral surgery consultation and I have reviewed the case with the medical residents and the patient will be admitted and will likely have a dental procedure tomorrow.    IMPRESSION  1.  Dental infection secondary to dental caries         Electronically signed by: James Hayden, 6/4/2018 11:43 PM

## 2018-06-05 NOTE — CARE PLAN
Problem: Knowledge Deficit  Goal: Knowledge of disease process/condition, treatment plan, diagnostic tests, and medications will improve    Intervention: Explain information regarding disease process/condition, treatment plan, diagnostic tests, and medications and document in education  POC discussed, patient aware that he is NPO for probable dental surgery in the morning and will likely be discharged shortly thereafter. Pt oriented to unit routine, use of call light, smoking policy, and safety. All questions answered.      Problem: Pain Management  Goal: Pain level will decrease to patient's comfort goal    Intervention: Follow pain managment plan developed in collaboration with patient and Interdisciplinary Team  Patient educated on 0-10 pain scale use. Patient's pain is being managed with Ibuprofen Q8hrs PRN.

## 2018-06-05 NOTE — PROGRESS NOTES
Internal Medicine Interval Note  Note Author: Heladio Tuttle M.D.     Name Andre Briceño 1970   Age/Sex 48 y.o. male   MRN [id number]   Code Status FULL     After 5PM or if no immediate response to page, please call for cross-coverage  Attending/Team: Dr Best / Cory See Patient List for primary contact information  Call [phone] to page    1st Call - Day Intern (R1):   Dr Chavarria 2nd Call - Day Sr. Resident (R2/R3):   Dr Tuttle         Reason for interval visit  (Principal Problem)   Dental abscess    Interval Problem Daily Status Update  (24 hours)     Admitted overnight  Denies fevers/chills,cp,sob  Does note difficult swallowing, chewing due to swelling jaw pain  VSS AF  On IV Unasyn  Npo pending surgery recs    Review of Systems   Constitutional: Negative for chills, fever, malaise/fatigue and weight loss.   HENT:        Right low jaw pain/swelling   Eyes: Negative for blurred vision and double vision.   Respiratory: Negative for cough and hemoptysis.    Cardiovascular: Negative for chest pain and palpitations.   Gastrointestinal: Negative for blood in stool, constipation, heartburn, melena and nausea.   Genitourinary: Negative for dysuria and urgency.   Musculoskeletal: Negative for back pain, myalgias and neck pain.   Skin: Negative for rash.   Neurological: Negative for dizziness, tingling and sensory change.   Psychiatric/Behavioral: Negative for depression. The patient is not nervous/anxious.        Consultants/Specialty  Oral surgery    Disposition  Inpatient pending surgery recs    Quality Measures  Quality-Core Measures   Reviewed items::  EKG reviewed, Radiology images reviewed, Labs reviewed and Medications reviewed  Venegas catheter::  No Venegas  DVT prophylaxis pharmacological::  Enoxaparin (Lovenox)          Physical Exam       Vitals:    18 2353 18 0011 18 0400 18 0800   BP: 138/92 147/74 (!) 94/58 116/74   Pulse: 68 65 (!) 57 (!) 54   Resp: 15 16 16  16   Temp:  37 °C (98.6 °F) 37.2 °C (98.9 °F) 36.6 °C (97.9 °F)   TempSrc:       SpO2: 95% 96% 95% 98%   Weight:       Height:         Body mass index is 20.53 kg/m². Weight: 74.5 kg (164 lb 3.9 oz)  Oxygen Therapy:  Pulse Oximetry: 98 %, O2 (LPM): 0, O2 Delivery: None (Room Air)    Physical Exam   Constitutional: He is oriented to person, place, and time and well-developed, well-nourished, and in no distress. No distress.   HENT:   Head: Normocephalic and atraumatic.   Mouth/Throat: Mucous membranes are normal. Abnormal dentition. Dental abscesses and dental caries present. No uvula swelling.   Neck: Neck supple. No tracheal deviation present. No thyroid mass and no thyromegaly present.   Cardiovascular: Normal rate and regular rhythm.  Exam reveals no friction rub.    No murmur heard.  Pulmonary/Chest: Effort normal and breath sounds normal. No respiratory distress. He has no wheezes.   Abdominal: Soft. Bowel sounds are normal. He exhibits no distension. There is no tenderness.   Musculoskeletal: Normal range of motion. He exhibits no edema or deformity.   Neurological: He is alert and oriented to person, place, and time. No cranial nerve deficit. Coordination normal. GCS score is 15.   Skin: Skin is warm. He is not diaphoretic.   Psychiatric: Affect and judgment normal.         Lab Data Review:         6/5/2018  11:20 AM    Recent Labs      06/04/18 2151 06/05/18   0204   SODIUM  140  139   POTASSIUM  3.9  4.0   CHLORIDE  105  105   CO2  28  27   BUN  14  14   CREATININE  0.99  0.86   CALCIUM  9.4  9.0       Recent Labs      06/04/18 2151 06/05/18   0204   ALTSGPT  13  11   ASTSGOT  13  12   ALKPHOSPHAT  68  59   TBILIRUBIN  0.2  0.2   GLUCOSE  104*  107*       Recent Labs      06/04/18 2151 06/05/18   0204   RBC  4.58*  4.03*   HEMOGLOBIN  14.9  13.3*   HEMATOCRIT  43.2  37.7*   PLATELETCT  264  235   PROTHROMBTM  12.8   --    APTT  28.5   --    INR  0.99   --        Recent Labs      06/04/18 2151   06/05/18   0204   WBC  6.1  5.1   NEUTSPOLYS  63.10  47.60   LYMPHOCYTES  26.60  35.80   MONOCYTES  9.40  14.80*   EOSINOPHILS  0.30  1.20   BASOPHILS  0.30  0.40   ASTSGOT  13  12   ALTSGPT  13  11   ALKPHOSPHAT  68  59   TBILIRUBIN  0.2  0.2           Assessment/Plan     * Dental abscess- (present on admission)   Assessment & Plan    Mild-moderate swelling in right lower jaw-orofacial soft tissue- submental/submandibular area, but no difficulty with swallowing or breathing, but difficulty chewing/eating.  Mandible Xray: No acute fracture or dislocation.  Started on Unaysn in ER.  Will continue, Q6H.   ERP called oral surgery- will f/u additional recs. Keep npo, pending surgery        Psychiatric disorder- (present on admission)   Assessment & Plan    Patient on On GABAPENTIN 300MG, NALTREXONE 50MG, and SEROQUEL 50MG   Will continue        Seizure disorder (HCC), and H/O Bipolar.    Assessment & Plan    On Dilantin per patient  Continue while inpatient

## 2018-06-05 NOTE — OR SURGEON
Immediate Post OP Note    PreOp Diagnosis: Right buccal space abscess, abscessed teeth 21 and 28    PostOp Diagnosis: same    Procedure(s):  Buccal Space Abscess  DENTAL EXTRACTION(S) TOOTH 21 & 28 - Wound Class: Dirty or Infected    Surgeon(s):  Thierry Marinelli DMD, M.D.    Anesthesiologist/Type of Anesthesia:  Anesthesiologist: Florentino Navas M.D./General    Surgical Staff:  Circulator: Abbe Ziegler RBRYON; Charla Rivas RBRYON  Scrub Person: Charity Lowry    Specimens removed if any:  * No specimens in log *    Estimated Blood Loss: minimal    Findings: consistent with diagnosis    Complications: none        6/5/2018 2:58 PM Thierry Marinelli DMD, M.D.

## 2018-06-05 NOTE — ASSESSMENT & PLAN NOTE
On presentation: Mild-moderate swelling in right lower jaw-orofacial soft tissue- submental/submandibular area, but no difficulty with swallowing or breathing, but difficulty chewing/eating.  Mandible Xray: No acute fracture or dislocation.  Started on Unaysn in ER.   Underwent surgery with dental extractions + buccal I&D on 6/5  Discharging 6/6 with augmentin

## 2018-06-05 NOTE — H&P
Internal Medicine Admitting History and Physical    Name Andre Briceño       1970   Age/Sex 48 y.o. male   MRN [id number]   Code Status full     After 5PM or if no immediate response to page, please call for cross-coverage  Attending/Team: Dr. Best  /  Blue team See Patient List for primary contact information  Call [phone] to page    1st Call - Day Intern (R1):   Dr. Chavarria 2nd Call - Day Sr. Resident (R2/R3):   Dr. Tuttle       Chief Complaint:  Dental abscess    HPI:  Andre Briceño is a 48 y.o. male who presents for dental abscess.     He states he was going to have a tooth extraction in May, but then ended up in alf.  He told the doctor there about it, but was only placed on amoxicillin (500 bid) and ibuprofen (8800, BID), for this (for 20 days).  He has since developed tenderness in right submandibular gland region, and puffiness over the right lower jaw.  He notes it has hurt to chew, for the past 3 weeks.  He notes he had some fever/chills, at the start of the pain (3 weeks ago), but not since.     In the ER, he was started on Unasyn, and the ERP called a dental consult who (reportedly) will take the patient to surgery in the morning.        Also, the patient notes he has a hx of bipolar and a seizure disorder (where he is unaware of what is happening).  He states he takes multiple meds for this, but is not certain of the dosing.    He states he takes Seroquel 50 AM, 50 PM, 400 QHS, but epic notes suggest total of 200 qhs.  He is uncertain of his gabapentin dose (but takes it 4 times per day ... He thinks 100 mg, per dose). He takes naltrexone (uncertain if 50 or 100 daily ... He states this is also for his seizure disorder).  He is on dilantin 300, daily.  Epic also notes he is on Keppra, 500 BID, but he does not recall taking this medication.        Review of Systems   Constitutional: Negative for chills, diaphoresis, fever and malaise/fatigue.   HENT: Negative for ear discharge, ear  pain, hearing loss, sinus pain, sore throat and tinnitus.         Pain over lower right jaw / mandible / submandibular gland region.  No problem with swallowing or breathing.    Eyes: Negative for blurred vision, double vision, pain and discharge.   Respiratory: Negative for cough, hemoptysis, sputum production, wheezing and stridor.    Cardiovascular: Negative for chest pain, palpitations and leg swelling.   Gastrointestinal: Negative for abdominal pain, constipation, diarrhea, nausea and vomiting.   Genitourinary: Negative for dysuria, flank pain and hematuria.   Musculoskeletal: Negative for joint pain, myalgias and neck pain.   Skin: Negative for itching and rash.   Neurological: Negative for dizziness, tingling, tremors, sensory change and headaches.   Endo/Heme/Allergies: Negative for environmental allergies. Does not bruise/bleed easily.   Psychiatric/Behavioral: Negative for hallucinations and memory loss.        Hx of bipolar and seizure. - see HPI.              Past Medical History:   Past Medical History:   Diagnosis Date   • Bipolar 1 disorder (HCC)    • Bronchitis    • Pain        Past Surgical History:  Past Surgical History:   Procedure Laterality Date   • MENISCUS REPAIR  12/10/2014    Performed by Jose Araya M.D. at SURGERY [facility] ORS   • FINGER ORIF     • HAND SURGERY      left index finger, right wrist   • TONSILLECTOMY AND ADENOIDECTOMY     • WRIST ORIF         Family History:  History reviewed. No pertinent family history.    Social History:  Social History     Social History   • Marital status:      Spouse name: N/A   • Number of children: N/A   • Years of education: N/A     Occupational History   • Not on file.     Social History Main Topics   • Smoking status: Former Smoker     Packs/day: 0.50     Types: Cigarettes   • Smokeless tobacco: Never Used   • Alcohol use No      Comment: quit 1 month ago   • Drug use: No      Comment: previous marijuana   • Sexual activity:  "Not on file     Other Topics Concern   • Not on file     Social History Narrative   • No narrative on file       Current Outpatient Medications:  Home Medications     Reviewed by Missy Figueroa R.N. (Registered Nurse) on 06/04/18 at 2040  Med List Status: Complete   Medication Last Dose Status   amoxicillin (AMOXIL) 500 MG Cap 6/4/2018 Active   gabapentin (NEURONTIN) 300 MG Cap 6/4/2018 Active   ibuprofen (MOTRIN) 800 MG Tab 6/4/2018 Active   levETIRAcetam (KEPPRA) 500 MG Tab 6/4/2018 Active   naltrexone (DEPADE) 50 MG Tab 6/4/2018 Active   phenytoin ER (DILANTIN) 100 MG Cap 6/4/2018 Active   QUEtiapine (SEROQUEL) 200 MG Tab 6/3/2018 Active                Medication Allergy/Sensitivities:  No Known Allergies      Physical Exam     Vitals:    06/04/18 2003 06/04/18 2024   BP: 132/82    Pulse: 80    Resp: 16    Temp: 36.3 °C (97.3 °F)    TempSrc: Temporal    SpO2: 98%    Weight:  74.5 kg (164 lb 3.9 oz)   Height:  1.905 m (6' 3\")     Body mass index is 20.53 kg/m².  /82   Pulse 80   Temp 36.3 °C (97.3 °F) (Temporal)   Resp 16   Ht 1.905 m (6' 3\")   Wt 74.5 kg (164 lb 3.9 oz)   SpO2 98%   BMI 20.53 kg/m²   O2 therapy: Pulse Oximetry: 98 %, O2 Delivery: None (Room Air)    Physical Exam   Constitutional: He is oriented to person, place, and time and well-developed, well-nourished, and in no distress. No distress.   HENT:   Head: Normocephalic and atraumatic.   Mild puffy-swelling to right jaw / mandibular region.  Tenderness over right submandibular gland.    Lower right jaw with mild bleeding to the gumline of the 4th-5th tooth right of center, and mild inflammation, adjacent / posterior / lateral to that (with apparent chipped tooth there).    Eyes: EOM are normal. Pupils are equal, round, and reactive to light. Right eye exhibits no discharge. Left eye exhibits no discharge.   Neck: No JVD present. No tracheal deviation present.   Tenderness to the right submandibular gland and adjacent region.  "   Cardiovascular: Normal rate and regular rhythm.  Exam reveals no gallop and no friction rub.    No murmur heard.  Pulmonary/Chest: Effort normal. No stridor. No respiratory distress. He has no wheezes. He has no rales.   Abdominal: Soft. Bowel sounds are normal. He exhibits no distension. There is no tenderness. There is no rebound and no guarding.   Musculoskeletal: He exhibits no edema or tenderness.   Neurological: He is alert and oriented to person, place, and time.   Skin: Skin is warm and dry. No rash noted. He is not diaphoretic. No erythema. No pallor.   Psychiatric: Mood, memory and affect normal.         Data Review       Lab Data Review:  Recent Results (from the past 24 hour(s))   CBC WITH DIFFERENTIAL    Collection Time: 06/04/18  9:51 PM   Result Value Ref Range    WBC 6.1 4.8 - 10.8 K/uL    RBC 4.58 (L) 4.70 - 6.10 M/uL    Hemoglobin 14.9 14.0 - 18.0 g/dL    Hematocrit 43.2 42.0 - 52.0 %    MCV 94.3 81.4 - 97.8 fL    MCH 32.5 27.0 - 33.0 pg    MCHC 34.5 33.7 - 35.3 g/dL    RDW 41.9 35.9 - 50.0 fL    Platelet Count 264 164 - 446 K/uL    MPV 9.3 9.0 - 12.9 fL    Neutrophils-Polys 63.10 44.00 - 72.00 %    Lymphocytes 26.60 22.00 - 41.00 %    Monocytes 9.40 0.00 - 13.40 %    Eosinophils 0.30 0.00 - 6.90 %    Basophils 0.30 0.00 - 1.80 %    Immature Granulocytes 0.30 0.00 - 0.90 %    Nucleated RBC 0.00 /100 WBC    Neutrophils (Absolute) 3.81 1.82 - 7.42 K/uL    Lymphs (Absolute) 1.61 1.00 - 4.80 K/uL    Monos (Absolute) 0.57 0.00 - 0.85 K/uL    Eos (Absolute) 0.02 0.00 - 0.51 K/uL    Baso (Absolute) 0.02 0.00 - 0.12 K/uL    Immature Granulocytes (abs) 0.02 0.00 - 0.11 K/uL    NRBC (Absolute) 0.00 K/uL   COMP METABOLIC PANEL    Collection Time: 06/04/18  9:51 PM   Result Value Ref Range    Sodium 140 135 - 145 mmol/L    Potassium 3.9 3.6 - 5.5 mmol/L    Chloride 105 96 - 112 mmol/L    Co2 28 20 - 33 mmol/L    Anion Gap 7.0 0.0 - 11.9    Glucose 104 (H) 65 - 99 mg/dL    Bun 14 8 - 22 mg/dL    Creatinine  0.99 0.50 - 1.40 mg/dL    Calcium 9.4 8.5 - 10.5 mg/dL    AST(SGOT) 13 12 - 45 U/L    ALT(SGPT) 13 2 - 50 U/L    Alkaline Phosphatase 68 30 - 99 U/L    Total Bilirubin 0.2 0.1 - 1.5 mg/dL    Albumin 4.7 3.2 - 4.9 g/dL    Total Protein 7.8 6.0 - 8.2 g/dL    Globulin 3.1 1.9 - 3.5 g/dL    A-G Ratio 1.5 g/dL   PROTHROMBIN TIME    Collection Time: 06/04/18  9:51 PM   Result Value Ref Range    PT 12.8 12.0 - 14.6 sec    INR 0.99 0.87 - 1.13   APTT    Collection Time: 06/04/18  9:51 PM   Result Value Ref Range    APTT 28.5 24.7 - 36.0 sec   ESTIMATED GFR    Collection Time: 06/04/18  9:51 PM   Result Value Ref Range    GFR If African American >60 >60 mL/min/1.73 m 2    GFR If Non African American >60 >60 mL/min/1.73 m 2       Imaging/Procedures Review:    Imaging Review: Completed     [id number]   Final Result      No acute fracture or dislocation.                Assessment & Plan     * Dental abscess- (present on admission)   Assessment & Plan    Mild swelling in adjacent soft tissues, but no difficulty with swallowing or breathing.   Started on Unaysn in ER.  Will continue, Q6H.   ERP called dental consult, and (reportedly) will perform surgery in morning.         Seizure disorder (HCC), and H/O Bipolar.    Assessment & Plan    Patient was uncertain of some doses/medications, and some were different from what was in EPIC.   (See HPI for more detailed information).   ER-pharmacy asked to check on medications from pharmacy (but unable to do so until morning).   Day-team to check with pharmacy, to ensure correct dosing.             Anticipated Hospital stay:  >2 midnights    Quality Measures  Quality-Core Measures   Reviewed items::  Labs reviewed, Medications reviewed and Radiology images reviewed  Venegas catheter::  No Venegas  DVT: held for likely surgery in morning.   DVT prophylaxis - mechanical:  SCDs

## 2018-06-05 NOTE — PROGRESS NOTES
Senior admit note     Name Andre Briceño       1970   Age/Sex 48 y.o. male   MRN [id number]       HPI:  47 yo M with pmhx is here for dental infection/abscess. Pt reported of pain and swelling of the right lower teeth 4-5 days ago. Reported went to [facility] dental and started on amoxicillin and scheduled for dental extraction , however, pt end up being in [facility] , missed his appointment and his teeth infection got worse. Pt was on oral ABx for almost 3 weeks. Reported has some purulent discharge as well as bleeding from his teeth. Has difficulty eating, however, able to swallow food. ED physician contacted  Dr. Marinelli oral surgery consultation who likely have a dental procedure tomorrow      Past Medical History:   Past Medical History:   Diagnosis Date   • Bipolar 1 disorder (HCC)    • Bronchitis    • Pain        Past Surgical History:  Past Surgical History:   Procedure Laterality Date   • MENISCUS REPAIR  12/10/2014    Performed by Jose Araya M.D. at SURGERY [facility] ORS   • FINGER ORIF     • HAND SURGERY      left index finger, right wrist   • TONSILLECTOMY AND ADENOIDECTOMY     • WRIST ORIF         Current Outpatient Medications:  Home Medications     Reviewed by Emily Joseph R.N. (Registered Nurse) on 18 at 0114  Med List Status: Partial   Medication Last Dose Status   amoxicillin (AMOXIL) 500 MG Cap 2018 Active   gabapentin (NEURONTIN) 300 MG Cap 2018 Active   ibuprofen (MOTRIN) 800 MG Tab 2018 Active   levETIRAcetam (KEPPRA) 500 MG Tab 2018 Active   naltrexone (DEPADE) 50 MG Tab 2018 Active   phenytoin ER (DILANTIN) 100 MG Cap 2018 Active   QUEtiapine (SEROQUEL) 200 MG Tab 6/3/2018 Active                Medication Allergy/Sensitivities:  No Known Allergies    Family History:  History reviewed. No pertinent family history.    Social History:  Social History     Social History   • Marital status:      Spouse name: N/A   • Number of  "children: N/A   • Years of education: N/A     Occupational History   • Not on file.     Social History Main Topics   • Smoking status: Former Smoker     Packs/day: 1.00     Years: 20.00     Types: Cigarettes   • Smokeless tobacco: Never Used      Comment: Quit 5/2018   • Alcohol use No      Comment: quit 5/2018, prev.heavy use (3 pints vodka per day)   • Drug use: No      Comment: previous marijuana, quit 5/2018   • Sexual activity: Not on file     Other Topics Concern   • Not on file     Social History Narrative   • No narrative on file         Physical Exam  Vitals:    06/04/18 2003 06/04/18 2024 06/04/18 2353 06/05/18 0011   BP: 132/82  138/92 147/74   Pulse: 80  68 65   Resp: 16  15 16   Temp: 36.3 °C (97.3 °F)   37 °C (98.6 °F)   TempSrc: Temporal      SpO2: 98%  95% 96%   Weight:  74.5 kg (164 lb 3.9 oz)     Height:  1.905 m (6' 3\")       Body mass index is 20.53 kg/m².  /74   Pulse 65   Temp 37 °C (98.6 °F)   Resp 16   Ht 1.905 m (6' 3\")   Wt 74.5 kg (164 lb 3.9 oz)   SpO2 96%   BMI 20.53 kg/m²   O2 therapy: Pulse Oximetry: 96 %, O2 (LPM): 0, O2 Delivery: None (Room Air)    Constitutional:   Well developed, Well nourished, No acute distress, Non-toxic appearance.   HEENT: poor dental hygiene, multiple teeth loss, teeth fractures, dental caries , firm induration of the Rt lower teeth surrounding tissue, no obvious discharge   Neck:  Normal range of motion, No cervical tenderness, Supple, No stridor, no JVD.  Cardiovascular:  Normal heart rate, Normal rhythm, No murmurs, No rubs, No gallops.   Extremitites with intact distal pulses, no cyanosis, clubbing or edema.  Lungs:  Respiratory effort is normal. Normal breath sounds, breath sounds clear to auscultation bilaterally,  no rales, no rhonchi, no wheezing.   Abdomen: Bowel sounds normal, Soft, No tenderness, No guarding, No rebound, No masses, No hepatosplenomegaly.  Skin: Warm, Dry, No erythema, No rash, no induration or crepitus.  Neurologic: " Alert & oriented x 3, Normal motor function, Normal sensory function, No focal deficits noted, cranial nerves II through XII are normal,  normal gait.  Psychiatric: Affect normal, Judgment normal, Mood normal.        Lab Data Review:  Recent Results (from the past 24 hour(s))   CBC WITH DIFFERENTIAL    Collection Time: 06/04/18  9:51 PM   Result Value Ref Range    WBC 6.1 4.8 - 10.8 K/uL    RBC 4.58 (L) 4.70 - 6.10 M/uL    Hemoglobin 14.9 14.0 - 18.0 g/dL    Hematocrit 43.2 42.0 - 52.0 %    MCV 94.3 81.4 - 97.8 fL    MCH 32.5 27.0 - 33.0 pg    MCHC 34.5 33.7 - 35.3 g/dL    RDW 41.9 35.9 - 50.0 fL    Platelet Count 264 164 - 446 K/uL    MPV 9.3 9.0 - 12.9 fL    Neutrophils-Polys 63.10 44.00 - 72.00 %    Lymphocytes 26.60 22.00 - 41.00 %    Monocytes 9.40 0.00 - 13.40 %    Eosinophils 0.30 0.00 - 6.90 %    Basophils 0.30 0.00 - 1.80 %    Immature Granulocytes 0.30 0.00 - 0.90 %    Nucleated RBC 0.00 /100 WBC    Neutrophils (Absolute) 3.81 1.82 - 7.42 K/uL    Lymphs (Absolute) 1.61 1.00 - 4.80 K/uL    Monos (Absolute) 0.57 0.00 - 0.85 K/uL    Eos (Absolute) 0.02 0.00 - 0.51 K/uL    Baso (Absolute) 0.02 0.00 - 0.12 K/uL    Immature Granulocytes (abs) 0.02 0.00 - 0.11 K/uL    NRBC (Absolute) 0.00 K/uL   COMP METABOLIC PANEL    Collection Time: 06/04/18  9:51 PM   Result Value Ref Range    Sodium 140 135 - 145 mmol/L    Potassium 3.9 3.6 - 5.5 mmol/L    Chloride 105 96 - 112 mmol/L    Co2 28 20 - 33 mmol/L    Anion Gap 7.0 0.0 - 11.9    Glucose 104 (H) 65 - 99 mg/dL    Bun 14 8 - 22 mg/dL    Creatinine 0.99 0.50 - 1.40 mg/dL    Calcium 9.4 8.5 - 10.5 mg/dL    AST(SGOT) 13 12 - 45 U/L    ALT(SGPT) 13 2 - 50 U/L    Alkaline Phosphatase 68 30 - 99 U/L    Total Bilirubin 0.2 0.1 - 1.5 mg/dL    Albumin 4.7 3.2 - 4.9 g/dL    Total Protein 7.8 6.0 - 8.2 g/dL    Globulin 3.1 1.9 - 3.5 g/dL    A-G Ratio 1.5 g/dL   PROTHROMBIN TIME    Collection Time: 06/04/18  9:51 PM   Result Value Ref Range    PT 12.8 12.0 - 14.6 sec    INR 0.99  0.87 - 1.13   APTT    Collection Time: 06/04/18  9:51 PM   Result Value Ref Range    APTT 28.5 24.7 - 36.0 sec   ESTIMATED GFR    Collection Time: 06/04/18  9:51 PM   Result Value Ref Range    GFR If African American >60 >60 mL/min/1.73 m 2    GFR If Non African American >60 >60 mL/min/1.73 m 2       Assessment/Plan    Dental infection 2/2 dental caries  -afebrile, no SIRS criteria  -Xray mandible panoramic: No acute fracture or dislocation.   ED physician contacted  Dr. Marinelli oral surgery consultation who likely have a dental procedure tomorrow  -NPO midnight for possible dental procedure AM  -blood Cx  -IV unasyn empirically       For detail, please see Dr. Yu H & P

## 2018-06-05 NOTE — CONSULTS
Chief Complaint:   Facial Swelling right side and tooth pain    History of Present Illness:   47 yo male with a 2 week history of facial edema and tooth pain.  Patient was seen in the ED.  Pt has no money to see a dentist.  He states he has had no f/c and ns.      Review of Systems:   Constitutional: positive low grade fevers, positive for chills  Eyes: Denies changes in vision or eye pain   Ears/Nose/Throat/Mouth: Denies nasal congestion. Positive for tooth pain and cheek swelling   Cardiovascular: Denies chest pain or palpitations   Respiratory: Denies shortness of breath , Denies cough   Gastrointestinal/Hepatic: Denies abdominal pain, nausea, vomiting, diarrhea, constipation or GI bleeding   Genitourinary: Denies bladder dysfunction, dysuria or frequency   Musculoskeletal/Rheum: Denies joint pain   Skin/Breast: Denies rash   Neurological: Denies headache, confusion, memory loss or focal weakness/parasthesias   Psychiatric: denies mood disorder   Endocrine: denies hx of diabetes or thyroid dysfunction   Heme/Oncology/Lymph Nodes: Denies enlarged lymph nodes, denies brusing or known bleeding disorder   Allergic/Immunologic: Denies hx of allergies     Medication Allergy/Sensitivities:   No Known Allergies     Family History:   Denies hx of cancer, DM, HTN     Physical Exam:   Vitals/ General Appearance: wnl  Weight/BMI:wnl  Blood pressure: wnl      Constitutional: Well developed, Well nourished, No acute distress, Non-toxic appearance.   HENMT: Normocephalic, Atraumatic, Bilateral external ears normal.  Facial edema Right side.  Generalized periodontal disease and generalized caries. Right buccal space.  Caries and obvious abscess of tooth #21 and 28.    Eyes: PERRLA, EOMI, Conjunctiva normal, No discharge.   Neck: Normal range of motion, No cervical tenderness, Supple, No stridor, airway clear. No crepitus of neck or sinuses   Cardiovascular: normal, Normal rhythm, No murmurs, No rubs, No gallops. Extremitites  with intact distal pulses, no cyanosis, clubbing or edema.   Lungs: Respiratory effort is normal. Normal breath sounds, breath sounds clear to auscultation bilaterally   Abdomen: Bowel sounds normal, Soft, No tenderness, No guarding, No rebound, No masses   Skin: Warm, Dry, No erythema, No rash, no induration or crepitus.   Neurologic: Alert & oriented x 3, Normal motor function, Normal sensory function, No focal deficits noted,   Psychiatric: Affect normal, Judgment normal, Mood normal.       Panoramic x-ray:   Right buccal space abscess.  Multiple carious teeth, generalized caries.    Assessment and Plan):     To the OR for Incision and Drainage of Right right buccal space abscess.  Extraction of multiple teeth including abscessed #21,28.  Exam under anesthesia and extraction of any non-restoreable teeth.  Risks, Benefits and Alternatives discussed with the patient.

## 2018-06-05 NOTE — PROGRESS NOTES
Called [facility] @ [phone]., pt has not filled since 4/2018.  Called [facility] @ [phone] to verify all prescription medications.  Pt last filled GABAPENTIN 300MG, NALTREXONE 50MG, and SEROQUEL 50MG on 4/13/2018 for 30 day supply.  No antibiotics in the last 30 days, per pharmacy.  Will have swing follow up with pt.

## 2018-06-05 NOTE — PROGRESS NOTES
Pt arrived to unit via wheelchair, ambulated to bed with no difficulties. Pt is A/Ox4, complaints of pain 5/10 in right side jaw, medicated per MAR. PIV assessed and patent, NS running at 100ml/hr. Two RN skin check complete, no wounds or pressure related injuries noted.

## 2018-06-05 NOTE — ED TRIAGE NOTES
"Andre Briceño  48 y.o.  Chief Complaint   Patient presents with   • Tooth Abscess     R lower jaw, states \"it's caused by a tooth. I saw a dentist but they said they can't pull the tooth until I get rid of the abscess\"     Ambulatory to triage with steady gait for above. Patient speaking in full sentences, managing secretion without difficulty. Airway patent.    States that he has been taking Amoxicillin 500 mg PO bid and Ibuprofen 800 mg PO bid at home x 20 days without improvement.        Triage process explained to patient, apologized for wait time, and returned to lobby.  "

## 2018-06-06 VITALS
OXYGEN SATURATION: 94 % | HEIGHT: 75 IN | SYSTOLIC BLOOD PRESSURE: 116 MMHG | WEIGHT: 164.24 LBS | TEMPERATURE: 97.9 F | BODY MASS INDEX: 20.42 KG/M2 | RESPIRATION RATE: 18 BRPM | DIASTOLIC BLOOD PRESSURE: 80 MMHG | HEART RATE: 62 BPM

## 2018-06-06 LAB
ANION GAP SERPL CALC-SCNC: 5 MMOL/L (ref 0–11.9)
BUN SERPL-MCNC: 12 MG/DL (ref 8–22)
CALCIUM SERPL-MCNC: 8.9 MG/DL (ref 8.5–10.5)
CHLORIDE SERPL-SCNC: 106 MMOL/L (ref 96–112)
CO2 SERPL-SCNC: 29 MMOL/L (ref 20–33)
CREAT SERPL-MCNC: 0.96 MG/DL (ref 0.5–1.4)
ERYTHROCYTE [DISTWIDTH] IN BLOOD BY AUTOMATED COUNT: 40.3 FL (ref 35.9–50)
GLUCOSE SERPL-MCNC: 129 MG/DL (ref 65–99)
HCT VFR BLD AUTO: 37.3 % (ref 42–52)
HGB BLD-MCNC: 13 G/DL (ref 14–18)
MCH RBC QN AUTO: 32.3 PG (ref 27–33)
MCHC RBC AUTO-ENTMCNC: 34.9 G/DL (ref 33.7–35.3)
MCV RBC AUTO: 92.8 FL (ref 81.4–97.8)
PLATELET # BLD AUTO: 247 K/UL (ref 164–446)
PMV BLD AUTO: 9.3 FL (ref 9–12.9)
POTASSIUM SERPL-SCNC: 4 MMOL/L (ref 3.6–5.5)
RBC # BLD AUTO: 4.02 M/UL (ref 4.7–6.1)
SODIUM SERPL-SCNC: 140 MMOL/L (ref 135–145)
WBC # BLD AUTO: 7.3 K/UL (ref 4.8–10.8)

## 2018-06-06 PROCEDURE — A9270 NON-COVERED ITEM OR SERVICE: HCPCS | Performed by: STUDENT IN AN ORGANIZED HEALTH CARE EDUCATION/TRAINING PROGRAM

## 2018-06-06 PROCEDURE — 700105 HCHG RX REV CODE 258: Performed by: STUDENT IN AN ORGANIZED HEALTH CARE EDUCATION/TRAINING PROGRAM

## 2018-06-06 PROCEDURE — 85027 COMPLETE CBC AUTOMATED: CPT

## 2018-06-06 PROCEDURE — 80048 BASIC METABOLIC PNL TOTAL CA: CPT

## 2018-06-06 PROCEDURE — 700111 HCHG RX REV CODE 636 W/ 250 OVERRIDE (IP): Performed by: STUDENT IN AN ORGANIZED HEALTH CARE EDUCATION/TRAINING PROGRAM

## 2018-06-06 PROCEDURE — 36415 COLL VENOUS BLD VENIPUNCTURE: CPT

## 2018-06-06 PROCEDURE — 99239 HOSP IP/OBS DSCHRG MGMT >30: CPT | Mod: GC | Performed by: INTERNAL MEDICINE

## 2018-06-06 PROCEDURE — 700102 HCHG RX REV CODE 250 W/ 637 OVERRIDE(OP): Performed by: STUDENT IN AN ORGANIZED HEALTH CARE EDUCATION/TRAINING PROGRAM

## 2018-06-06 RX ORDER — AMOXICILLIN AND CLAVULANATE POTASSIUM 875; 125 MG/1; MG/1
1 TABLET, FILM COATED ORAL 2 TIMES DAILY
Qty: 14 TAB | Refills: 0 | Status: SHIPPED | OUTPATIENT
Start: 2018-06-06 | End: 2018-06-06

## 2018-06-06 RX ORDER — AMOXICILLIN AND CLAVULANATE POTASSIUM 875; 125 MG/1; MG/1
1 TABLET, FILM COATED ORAL 2 TIMES DAILY
Qty: 1 QUANTITY SUFFICIENT | Refills: 0 | Status: SHIPPED | OUTPATIENT
Start: 2018-06-06 | End: 2018-06-16

## 2018-06-06 RX ADMIN — AMPICILLIN SODIUM AND SULBACTAM SODIUM 3 G: 2; 1 INJECTION, POWDER, FOR SOLUTION INTRAMUSCULAR; INTRAVENOUS at 11:03

## 2018-06-06 RX ADMIN — PHENYTOIN SODIUM 300 MG: 100 CAPSULE ORAL at 08:29

## 2018-06-06 RX ADMIN — QUETIAPINE FUMARATE 50 MG: 25 TABLET ORAL at 12:30

## 2018-06-06 RX ADMIN — GABAPENTIN 100 MG: 100 CAPSULE ORAL at 12:30

## 2018-06-06 RX ADMIN — NALTREXONE HYDROCHLORIDE 50 MG: 50 TABLET, FILM COATED ORAL at 08:28

## 2018-06-06 RX ADMIN — GABAPENTIN 100 MG: 100 CAPSULE ORAL at 08:29

## 2018-06-06 RX ADMIN — AMPICILLIN SODIUM AND SULBACTAM SODIUM 3 G: 2; 1 INJECTION, POWDER, FOR SOLUTION INTRAMUSCULAR; INTRAVENOUS at 04:34

## 2018-06-06 RX ADMIN — QUETIAPINE FUMARATE 50 MG: 25 TABLET ORAL at 08:29

## 2018-06-06 RX ADMIN — STANDARDIZED SENNA CONCENTRATE AND DOCUSATE SODIUM 2 TABLET: 8.6; 5 TABLET, FILM COATED ORAL at 08:28

## 2018-06-06 ASSESSMENT — PAIN SCALES - GENERAL: PAINLEVEL_OUTOF10: 0

## 2018-06-06 NOTE — PROGRESS NOTES
AVS paperwork reviewed with patient and wife. Patient acknowledges understanding of discharge instructions. PIV removed. Patient chose to ambulate to car with wife and kids. Patient had all belongings at time of DC. No concerns.

## 2018-06-06 NOTE — DISCHARGE SUMMARY
Internal Medicine Discharge Summary  Note Author: Shelley Chavarria M.D.       Admit Date:  6/4/2018       Discharge Date:   6/6/2018    Service:   [facility] Internal Medicine Blue Team  Attending Physician(s):   Dr. Best       Senior Resident(s):   Dr. Tuttle  Ralph Resident(s):   Dr. Chavarria      Primary Diagnosis:   Dental abscess    Secondary Diagnoses:                Principal Problem:    Dental abscess POA: Yes  Active Problems:    Seizure disorder (HCC), and H/O Bipolar.  POA: Unknown    Psychiatric disorder POA: Yes  Resolved Problems:    * No resolved hospital problems. *      Hospital Summary (Brief Narrative):       Mr. Briceño is a 48M with PMHx of bipolar disorder and seizure disorder on seroquel, gabapentin, naltrexone, and dilantin who presented on 6/4/18 with R jaw pain and swelling. He had been scheduled for a May tooth extraction but then went to [facility]; when he informed the [facility] doctor about it he was placed on amoxicillin + ibuprofen, and subsequently developed worsening pain and tenderness in the R submandibular gland region. Mandible XR found no e/o acute fracture or dislocation. ED labs demonstrated no leukocytosis and normal INR. He was begun on IV Unasyn ad oral surgery was consulted and patient was taken for surgery on 6/5/18 at which time abscess of teeth # 21 and #28 and a buccal space abscess; dental extractions and buccal l&D were performed. His pain resolved and swelling improved greatly by the following morning and he was able to eat without difficulty and showed no sx or signs of infection extension. Therefore he was discharged on 6/6/18 on home meds + 10 day course of augmentin.     Patient /Hospital Summary (Details -- Problem Oriented) :          * Dental abscess   Assessment & Plan    Mild-moderate swelling in right lower jaw-orofacial soft tissue- submental/submandibular area, but no difficulty with swallowing or breathing, but difficulty chewing/eating.  Mandible Xray: No acute  fracture or dislocation.  Started on Unaysn in ER.  Will continue, Q6H.   ERP called oral surgery- will f/u additional recs. Keep npo, pending surgery        Psychiatric disorder   Assessment & Plan    Patient on On GABAPENTIN 300MG, NALTREXONE 50MG, and SEROQUEL 50MG   Will continue        Seizure disorder (HCC), and H/O Bipolar.    Assessment & Plan    On Dilantin per patient  Continue while inpatient             Consultants:     Oral Surgery - Dr. Thierry Marinelli    Procedures:        Dental extraction x2, buccal I&D    Imaging/ Testing:      [id number]   Final Result      No acute fracture or dislocation.          Discharge Medications:         Medication Reconciliation: Completed       Medication List      START taking these medications      Instructions   amoxicillin-clavulanate 875-125 MG Tabs  Commonly known as:  AUGMENTIN   Take 1 Tab by mouth 2 times a day for 10 days.  Dose:  1 Tab        CONTINUE taking these medications      Instructions   gabapentin 300 MG Caps  Commonly known as:  NEURONTIN   Take 300 mg by mouth 4 times a day.  Dose:  300 mg     ibuprofen 800 MG Tabs  Commonly known as:  MOTRIN   Take 800 mg by mouth 2 times a day as needed (Pain).  Dose:  800 mg     naltrexone 50 MG Tabs  Commonly known as:  DEPADE   Take 50 mg by mouth every morning.  Dose:  50 mg     phenytoin  MG Caps  Commonly known as:  DILANTIN   Take 300 mg by mouth every day.  Dose:  300 mg     * SEROQUEL 50 MG tablet  Generic drug:  quetiapine   Take 50 mg by mouth 2 times a day. 50 mg every morning 50 mg every afternoon  Dose:  50 mg     * SEROQUEL 400 MG tablet  Generic drug:  quetiapine   Take 400 mg by mouth every evening.  Dose:  400 mg        * This list has 2 medication(s) that are the same as other medications prescribed for you. Read the directions carefully, and ask your doctor or other care provider to review them with you.            STOP taking these medications    amoxicillin 500 MG  Caps  Commonly known as:  AMOXIL            Can use .DISCHARGEMEDSLIST if going to another facility         Disposition:   home    Diet:   No change from baseline    Activity:  No change from baseline    Instructions:      Instructed on medications and follow ups.    The patient was instructed to return to the ER in the event of worsening symptoms. I have counseled the patient on the importance of compliance and the patient has agreed to proceed with all medical recommendations and follow up plan indicated above.   The patient understands that all medications come with benefits and risks. Risks may include permanent injury or death and these risks can be minimized with close reassessment and monitoring.        Primary Care Provider:    Pcp Pt States None  Discharge summary faxed to primary care provider:  Deferred  Copy of discharge summary given to the patient: Deferred      Follow up appointment details :      -with oral surgery, only as needed per Dr. Marinelli    Pending Studies:        -blood cultures, final    Time spent on discharge day patient visit, preparing discharge paperwork and arranging for patient follow up.    Summary of follow up issues:   -follow up only as needed for sx/signs worsening infection. These were discussed with patient by medical team and by Dr. Marinelli.     Discharge Time (Minutes) :    60  Hospital Course Type:  Inpatient Stay >2 midnights      Condition on Discharge  Stable  ______________________________________________________________________    Interval history/exam for day of discharge:    Pt feels well and has no complaints today. He has minimal discomfort of his R-sided jaw and has been tolerating eating well without complaints. He denies sx or signs of infection extension and denies fevers/chills/malaise.    Vitals:    06/04/18 2024 06/06/18 0000 06/06/18 0400 06/06/18 0800   BP:  122/81 102/64 116/80   Pulse:  72 65 62   Resp:  16 14 18   Temp:  36.7 °C (98.1 °F) 36.4 °C  "(97.6 °F) 36.6 °C (97.9 °F)   TempSrc:       SpO2:  94% 93% 94%   Weight: 74.5 kg (164 lb 3.9 oz)      Height: 1.905 m (6' 3\")        Weight/BMI: Body mass index is 20.53 kg/m².  Pulse Oximetry: 94 %, O2 (LPM): 0, O2 Delivery: None (Room Air)    Physical exam:   Constitutional: He is oriented to person, place, and time and well-developed, well-nourished, and in no distress. No distress.   HENT:   Head: Normocephalic and atraumatic.   Very mild swelling to right jaw / mandibular region.  Swelling and tenderness improved from time of admission. Inner mouth reveals areas of multiple dental extractions. Buccal mucosa appears pink and moist without signs of local infection, and no site of incision was seen.  Eyes: Right eye exhibits no discharge. Left eye exhibits no discharge. No conjunctival injection or scleral icterus.  Neck: Tenderness to the right submandibular gland much improved and there is no tenderness over right-sided neck.   Cardiovascular: Normal rate and regular rhythm.  Exam reveals no gallop and no friction rub.    No murmur heard.  Pulmonary/Chest: Effort normal. No stridor. No respiratory distress. He has no wheezes. He has no rales.   Abdominal: Soft. Bowel sounds are normal. He exhibits no distension. There is no tenderness. There is no rebound and no guarding.   Musculoskeletal: He exhibits no edema or tenderness.   Neurological: He is alert and oriented to person, place, and time.   Skin: Skin is warm and dry. No rash noted. He is not diaphoretic. No erythema. No pallor.   Psychiatric: Mood, memory and affect normal.     Most Recent Labs:    Lab Results   Component Value Date/Time    WBC 7.3 06/06/2018 03:19 AM    RBC 4.02 (L) 06/06/2018 03:19 AM    HEMOGLOBIN 13.0 (L) 06/06/2018 03:19 AM    HEMATOCRIT 37.3 (L) 06/06/2018 03:19 AM    MCV 92.8 06/06/2018 03:19 AM    MCH 32.3 06/06/2018 03:19 AM    MCHC 34.9 06/06/2018 03:19 AM    MPV 9.3 06/06/2018 03:19 AM    NEUTSPOLYS 47.60 06/05/2018 02:04 AM    " LYMPHOCYTES 35.80 06/05/2018 02:04 AM    MONOCYTES 14.80 (H) 06/05/2018 02:04 AM    EOSINOPHILS 1.20 06/05/2018 02:04 AM    BASOPHILS 0.40 06/05/2018 02:04 AM      Lab Results   Component Value Date/Time    SODIUM 140 06/06/2018 03:19 AM    POTASSIUM 4.0 06/06/2018 03:19 AM    CHLORIDE 106 06/06/2018 03:19 AM    CO2 29 06/06/2018 03:19 AM    GLUCOSE 129 (H) 06/06/2018 03:19 AM    BUN 12 06/06/2018 03:19 AM    CREATININE 0.96 06/06/2018 03:19 AM      Lab Results   Component Value Date/Time    ALTSGPT 11 06/05/2018 02:04 AM    ASTSGOT 12 06/05/2018 02:04 AM    ALKPHOSPHAT 59 06/05/2018 02:04 AM    TBILIRUBIN 0.2 06/05/2018 02:04 AM    ALBUMIN 3.9 06/05/2018 02:04 AM    GLOBULIN 2.6 06/05/2018 02:04 AM    INR 0.99 06/04/2018 09:51 PM     Lab Results   Component Value Date/Time    PROTHROMBTM 12.8 06/04/2018 09:51 PM    INR 0.99 06/04/2018 09:51 PM

## 2018-06-06 NOTE — DISCHARGE INSTRUCTIONS
Discharge Instructions    Discharged to home by car with relative. Discharged via walking, hospital escort: Refused.  Special equipment needed: Not Applicable    Be sure to schedule a follow-up appointment with your primary care doctor or any specialists as instructed.     Discharge Plan:   Smoking Cessation Offered: Patient Refused  Influenza Vaccine Indication: Patient Refuses    I understand that a diet low in cholesterol, fat, and sodium is recommended for good health. Unless I have been given specific instructions below for another diet, I accept this instruction as my diet prescription.   Other diet: Regular Diet    Special Instructions: None    · Is patient discharged on Warfarin / Coumadin?   No     Depression / Suicide Risk    As you are discharged from this [facility] facility, it is important to learn how to keep safe from harming yourself.    Recognize the warning signs:  · Abrupt changes in personality, positive or negative- including increase in energy   · Giving away possessions  · Change in eating patterns- significant weight changes-  positive or negative  · Change in sleeping patterns- unable to sleep or sleeping all the time   · Unwillingness or inability to communicate  · Depression  · Unusual sadness, discouragement and loneliness  · Talk of wanting to die  · Neglect of personal appearance   · Rebelliousness- reckless behavior  · Withdrawal from people/activities they love  · Confusion- inability to concentrate     If you or a loved one observes any of these behaviors or has concerns about self-harm, here's what you can do:  · Talk about it- your feelings and reasons for harming yourself  · Remove any means that you might use to hurt yourself (examples: pills, rope, extension cords, firearm)  · Get professional help from the community (Mental Health, Substance Abuse, psychological counseling)  · Do not be alone:Call your Safe Contact- someone whom you trust who will be there for you.  · Call  your local CRISIS HOTLINE 092-6479 or 904-623-8439  · Call your local Children's Mobile Crisis Response Team [place] (554) 340-2877 or www.[employer]  · Call the toll free National Suicide Prevention Hotlines   · National Suicide Prevention Lifeline 864-186-BIHY (0030)  · National Hope Line Network 800-SUICIDE (816-6636)

## 2018-06-06 NOTE — PROGRESS NOTES
Patient is alert and oriented. Vital signs stable on room air. Patient states pain is adequately controlled with medication; medicated per MAR. Patient resting comfortably through the night with minimal pain intervention. Patient is ambulatory; up self, steady gait. Voiding well. SCDs in place. Safety and fall precautions in place.

## 2018-06-06 NOTE — CARE PLAN
Problem: Safety  Goal: Will remain free from injury  Outcome: PROGRESSING AS EXPECTED  Safety precautions in place. Call light in reach; patient uses appropriately. Hourly rounding. Bed locked in lowest position.    Problem: Venous Thromboembolism (VTW)/Deep Vein Thrombosis (DVT) Prevention:  Goal: Patient will participate in Venous Thrombosis (VTE)/Deep Vein Thrombosis (DVT)Prevention Measures  Outcome: PROGRESSING AS EXPECTED  SCDs in place. Patient ambulatory.    Problem: Bowel/Gastric:  Goal: Normal bowel function is maintained or improved  Outcome: PROGRESSING AS EXPECTED  Patient had bowel movement 6/5. Patient refused scheduled stool softeners.     Problem: Pain Management  Goal: Pain level will decrease to patient's comfort goal  Outcome: PROGRESSING AS EXPECTED  Patient states pain is well controlled with 800 mg Ibuprofen.

## 2018-06-10 LAB
BACTERIA BLD CULT: NORMAL
BACTERIA BLD CULT: NORMAL
SIGNIFICANT IND 70042: NORMAL
SIGNIFICANT IND 70042: NORMAL
SITE SITE: NORMAL
SITE SITE: NORMAL
SOURCE SOURCE: NORMAL
SOURCE SOURCE: NORMAL

## 2018-10-19 ENCOUNTER — OFFICE VISIT (OUTPATIENT)
Dept: NEUROLOGY | Facility: MEDICAL CENTER | Age: 48
End: 2018-10-19
Payer: MEDICAID

## 2018-10-19 ENCOUNTER — TELEPHONE (OUTPATIENT)
Dept: NEUROLOGY | Facility: MEDICAL CENTER | Age: 48
End: 2018-10-19

## 2018-10-19 VITALS
RESPIRATION RATE: 16 BRPM | OXYGEN SATURATION: 94 % | DIASTOLIC BLOOD PRESSURE: 70 MMHG | HEIGHT: 75 IN | TEMPERATURE: 99.1 F | WEIGHT: 187 LBS | BODY MASS INDEX: 23.25 KG/M2 | HEART RATE: 74 BPM | SYSTOLIC BLOOD PRESSURE: 120 MMHG

## 2018-10-19 DIAGNOSIS — G40.909 SEIZURE DISORDER (HCC): ICD-10-CM

## 2018-10-19 PROCEDURE — 99204 OFFICE O/P NEW MOD 45 MIN: CPT | Performed by: PSYCHIATRY & NEUROLOGY

## 2018-10-19 RX ORDER — CYCLOBENZAPRINE HCL 10 MG
10 TABLET ORAL 3 TIMES DAILY PRN
COMMUNITY
End: 2019-09-27

## 2018-10-19 RX ORDER — ZOLPIDEM TARTRATE 10 MG/1
10 TABLET ORAL NIGHTLY PRN
COMMUNITY
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
## 2018-11-21 NOTE — PROGRESS NOTES
Future direct admit for 11/26/18 @ 0600 by Dr. Mooney for video EEG monitoring.  ADT signed & held, needs to be released upon pt arrival.  Written orders received, scanned to media tab.

## 2018-11-26 ENCOUNTER — HOSPITAL ENCOUNTER (INPATIENT)
Facility: MEDICAL CENTER | Age: 48
LOS: 4 days | DRG: 101 | End: 2018-11-30
Attending: PSYCHIATRY & NEUROLOGY | Admitting: PSYCHIATRY & NEUROLOGY
Payer: MEDICAID

## 2018-11-26 DIAGNOSIS — G40.909 SEIZURE DISORDER (HCC): ICD-10-CM

## 2018-11-26 DIAGNOSIS — G40.919 INTRACTABLE SEIZURES (HCC): ICD-10-CM

## 2018-11-26 DIAGNOSIS — K04.7 DENTAL ABSCESS: ICD-10-CM

## 2018-11-26 DIAGNOSIS — F99 PSYCHIATRIC DISORDER: ICD-10-CM

## 2018-11-26 DIAGNOSIS — S62.354D: ICD-10-CM

## 2018-11-26 DIAGNOSIS — Z98.890 STATUS POST MEDIAL MENISCUS REPAIR: ICD-10-CM

## 2018-11-26 PROCEDURE — 95951 HCHG EEG-VIDEO-24HR: CPT

## 2018-11-26 PROCEDURE — 700102 HCHG RX REV CODE 250 W/ 637 OVERRIDE(OP): Performed by: PSYCHIATRY & NEUROLOGY

## 2018-11-26 PROCEDURE — 770020 HCHG ROOM/CARE - TELE (206)

## 2018-11-26 PROCEDURE — 4A10X4Z MONITORING OF CENTRAL NERVOUS ELECTRICAL ACTIVITY, EXTERNAL APPROACH: ICD-10-PCS | Performed by: PSYCHIATRY & NEUROLOGY

## 2018-11-26 PROCEDURE — 95951 PR EEG MONITORING/VIDEORECORD: CPT | Mod: 26 | Performed by: PSYCHIATRY & NEUROLOGY

## 2018-11-26 PROCEDURE — A9270 NON-COVERED ITEM OR SERVICE: HCPCS | Performed by: PSYCHIATRY & NEUROLOGY

## 2018-11-26 RX ORDER — CYCLOBENZAPRINE HCL 10 MG
10 TABLET ORAL 3 TIMES DAILY PRN
Status: DISCONTINUED | OUTPATIENT
Start: 2018-11-26 | End: 2018-11-30 | Stop reason: HOSPADM

## 2018-11-26 RX ORDER — TRAZODONE HYDROCHLORIDE 100 MG/1
100 TABLET ORAL NIGHTLY
COMMUNITY

## 2018-11-26 RX ORDER — ZOLPIDEM TARTRATE 5 MG/1
10 TABLET ORAL NIGHTLY PRN
Status: DISCONTINUED | OUTPATIENT
Start: 2018-11-26 | End: 2018-11-30 | Stop reason: HOSPADM

## 2018-11-26 RX ORDER — GABAPENTIN 300 MG/1
300 CAPSULE ORAL 4 TIMES DAILY
Status: DISCONTINUED | OUTPATIENT
Start: 2018-11-26 | End: 2018-11-30 | Stop reason: HOSPADM

## 2018-11-26 RX ORDER — PHENYTOIN SODIUM 100 MG/1
300 CAPSULE, EXTENDED RELEASE ORAL
Status: COMPLETED | OUTPATIENT
Start: 2018-11-26 | End: 2018-11-26

## 2018-11-26 RX ORDER — NALTREXONE HYDROCHLORIDE 50 MG/1
50 TABLET, FILM COATED ORAL EVERY MORNING
Status: DISCONTINUED | OUTPATIENT
Start: 2018-11-27 | End: 2018-11-30 | Stop reason: HOSPADM

## 2018-11-26 RX ORDER — PHENYTOIN SODIUM 100 MG/1
300 CAPSULE, EXTENDED RELEASE ORAL DAILY
Status: DISCONTINUED | OUTPATIENT
Start: 2018-11-26 | End: 2018-11-30 | Stop reason: HOSPADM

## 2018-11-26 RX ORDER — IBUPROFEN 800 MG/1
800 TABLET ORAL 2 TIMES DAILY PRN
Status: DISCONTINUED | OUTPATIENT
Start: 2018-11-26 | End: 2018-11-30 | Stop reason: HOSPADM

## 2018-11-26 RX ORDER — QUETIAPINE FUMARATE 200 MG/1
400 TABLET, FILM COATED ORAL EVERY EVENING
Status: DISCONTINUED | OUTPATIENT
Start: 2018-11-26 | End: 2018-11-30 | Stop reason: HOSPADM

## 2018-11-26 RX ORDER — QUETIAPINE FUMARATE 25 MG/1
50 TABLET, FILM COATED ORAL 2 TIMES DAILY
Status: DISCONTINUED | OUTPATIENT
Start: 2018-11-26 | End: 2018-11-30 | Stop reason: HOSPADM

## 2018-11-26 RX ORDER — TRAZODONE HYDROCHLORIDE 100 MG/1
100 TABLET ORAL NIGHTLY
Status: DISCONTINUED | OUTPATIENT
Start: 2018-11-26 | End: 2018-11-30 | Stop reason: HOSPADM

## 2018-11-26 RX ADMIN — QUETIAPINE FUMARATE 400 MG: 200 TABLET ORAL at 21:57

## 2018-11-26 RX ADMIN — QUETIAPINE FUMARATE 50 MG: 25 TABLET ORAL at 11:00

## 2018-11-26 RX ADMIN — PHENYTOIN SODIUM 300 MG: 100 CAPSULE ORAL at 21:57

## 2018-11-26 RX ADMIN — TRAZODONE HYDROCHLORIDE 100 MG: 100 TABLET ORAL at 21:57

## 2018-11-26 RX ADMIN — GABAPENTIN 300 MG: 300 CAPSULE ORAL at 17:09

## 2018-11-26 RX ADMIN — GABAPENTIN 300 MG: 300 CAPSULE ORAL at 12:24

## 2018-11-26 RX ADMIN — GABAPENTIN 300 MG: 300 CAPSULE ORAL at 21:57

## 2018-11-26 RX ADMIN — QUETIAPINE FUMARATE 50 MG: 25 TABLET ORAL at 17:09

## 2018-11-26 ASSESSMENT — COGNITIVE AND FUNCTIONAL STATUS - GENERAL
MOVING TO AND FROM BED TO CHAIR: A LITTLE
STANDING UP FROM CHAIR USING ARMS: A LITTLE
TOILETING: A LITTLE
SUGGESTED CMS G CODE MODIFIER MOBILITY: CK
MOVING FROM LYING ON BACK TO SITTING ON SIDE OF FLAT BED: A LITTLE
MOBILITY SCORE: 18
DAILY ACTIVITIY SCORE: 18
SUGGESTED CMS G CODE MODIFIER DAILY ACTIVITY: CK
DRESSING REGULAR LOWER BODY CLOTHING: A LITTLE
CLIMB 3 TO 5 STEPS WITH RAILING: A LITTLE
WALKING IN HOSPITAL ROOM: A LITTLE
TURNING FROM BACK TO SIDE WHILE IN FLAT BAD: A LITTLE
DRESSING REGULAR UPPER BODY CLOTHING: A LITTLE
PERSONAL GROOMING: A LITTLE
EATING MEALS: A LITTLE
HELP NEEDED FOR BATHING: A LITTLE

## 2018-11-26 ASSESSMENT — PATIENT HEALTH QUESTIONNAIRE - PHQ9
6. FEELING BAD ABOUT YOURSELF - OR THAT YOU ARE A FAILURE OR HAVE LET YOURSELF OR YOUR FAMILY DOWN: SEVERAL DAYS
3. TROUBLE FALLING OR STAYING ASLEEP OR SLEEPING TOO MUCH: NEARLY EVERY DAY
5. POOR APPETITE OR OVEREATING: SEVERAL DAYS
SUM OF ALL RESPONSES TO PHQ QUESTIONS 1-9: 16
SUM OF ALL RESPONSES TO PHQ9 QUESTIONS 1 AND 2: 2
1. LITTLE INTEREST OR PLEASURE IN DOING THINGS: SEVERAL DAYS
7. TROUBLE CONCENTRATING ON THINGS, SUCH AS READING THE NEWSPAPER OR WATCHING TELEVISION: NEARLY EVERY DAY
2. FEELING DOWN, DEPRESSED, IRRITABLE, OR HOPELESS: SEVERAL DAYS
4. FEELING TIRED OR HAVING LITTLE ENERGY: NEARLY EVERY DAY
9. THOUGHTS THAT YOU WOULD BE BETTER OFF DEAD, OR OF HURTING YOURSELF: NOT AT ALL
8. MOVING OR SPEAKING SO SLOWLY THAT OTHER PEOPLE COULD HAVE NOTICED. OR THE OPPOSITE, BEING SO FIGETY OR RESTLESS THAT YOU HAVE BEEN MOVING AROUND A LOT MORE THAN USUAL: NEARLY EVERY DAY

## 2018-11-26 ASSESSMENT — LIFESTYLE VARIABLES
ALCOHOL_USE: NO
EVER_SMOKED: YES

## 2018-11-26 ASSESSMENT — PAIN SCALES - GENERAL: PAINLEVEL_OUTOF10: 0

## 2018-11-26 NOTE — PROCEDURES
5 DAYS OF INTENSIVE VIDEO ELECTROENCEPHALOGRAM REPORT     This Video EEG was done from 11/26/2018 to 11/30/2018        Referring provider: Dr. NAVARRETE     DOS:   From 11/26/2018 to 11/30/2018        INDICATION:  Andre Briceño 48 y.o. male presenting with intractable seizures     CURRENT ANTIEPILEPTIC REGIMEN: Dilantin + Gabapentin     DURATION:      ________________________________________________________________________     11/26/2018 (18 hrs and 26 mins)  11/27/2018 (23 hrs and 43 mins)  11/28/2018 (23 hrs and 40 mins)  11/29/2018 (23 hrs and 25 mins)  11/30/2018 (2 hrs and 6 minutes)  ________________________________________________________________________           TECHNIQUE: A 30-channel video electroencephalogram (VEEG) was performed in accordance with the international 10-20 system. This digital study was reviewed in bipolar and referential montages. The recording examined the patient during wakeful, drowsy and sleep states.            DESCRIPTION OF THE RECORD:     On 11/26/2018 Background rhythm during awake stage shows well-organized, well-developed, average voltage 10 to 11 hertz alpha activity in the posterior regions.  It blocks with eye opening and it is bilaterally synchronous and symmetrical.  No spike-and-wave discharges or any lateralizing abnormalities are seen.  Photic stimulation did not produce any abnormalities. Hyperventilation did not produce any abnormalities.  No abnormalities were found during the procedure. Stage II sleep was achieved.There are one spell of prolonged diffuse monomorphic theta burst lasting for close to one minute without clinical manifestations     On 11/27/2018 Background rhythm during awake stage shows well-organized, well-developed, average voltage 10 to 11 hertz alpha activity in the posterior regions.  It blocks with eye opening and it is bilaterally synchronous and symmetrical.  No spike-and-wave discharges or any lateralizing abnormalities are seen. No  abnormalities were found during the procedure. Stage II sleep was achieved.There are prolonged diffuse monomorphic theta burst lasting for close to one minute without clinical manifestations     On 11/28/2018 Background rhythm during awake stage shows well-organized, well-developed, average voltage 10 to 11 hertz alpha activity in the posterior regions.  It blocks with eye opening and it is bilaterally synchronous and symmetrical.  No spike-and-wave discharges or any lateralizing abnormalities are seen. No abnormalities were found during the procedure. Stage II sleep was achieved.There are prolonged diffuse monomorphic theta burst lasting for close to one minute without clinical manifestations     On 11/29/2018 Background rhythm during awake stage shows well-organized, well-developed, average voltage 10 to 11 hertz alpha activity in the posterior regions.  It blocks with eye opening and it is bilaterally synchronous and symmetrical.  No spike-and-wave discharges or any lateralizing abnormalities are seen. No abnormalities were found during the procedure. Stage II sleep was achieved.There are prolonged diffuse monomorphic theta burst lasting for close to one minute without clinical manifestations     On 11/30/2018 Background rhythm during awake stage shows well-organized, well-developed, average voltage 10 to 11 hertz alpha activity in the posterior regions.  It blocks with eye opening and it is bilaterally synchronous and symmetrical.  No spike-and-wave discharges or any lateralizing abnormalities are seen. No abnormalities were found during the procedure. Stage II sleep was achieved.There are prolonged diffuse monomorphic theta burst lasting for close to one minute without clinical manifestations        ACTIVATION PROCEDURES:      On 11/26/2018 Hyperventilation was performed by the patient. The technician performing the test noted good effort. This technique produced electroencephalographic changes in keeping with  the expected bilaterally synchronous, frontally predominant, high amplitude slow waves build up.      On 11/26/2018 Intermittent Photic stimulation was performed in a stepwise fashion from 1 to 30 Hz and elicited a normal response (photic driving), most noticeable in the posterior leads.        ICTAL AND/OR INTERICTAL FINDINGS:   No focal or generalized epileptiform activity was noted. No regional slowing was seen during this study.  No seizures were recorded during the study.      EVENT(S):  There are prolonged diffuse monomorphic theta burst lasting for close to one minute without clinical manifestations        EKG: sampling review of EKG recording demonstrated regular rhythm        INTERPRETATION:         ________________________________________________________________________     This is abnormal intensive video EEG recording in the awake and drowsy/sleep state(s) From 11/26/2018 to 11/30/2018     This scalp EEG denotes                  1. Nonspecific nonlocalizing cortical dysfunction / irratiblity --spells of prolonged theta bursts -- Probable frontal lobe seizures-- the focus is not localizing well  ________________________________________________________________________
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
## 2018-11-27 ENCOUNTER — TELEPHONE (OUTPATIENT)
Dept: NEUROLOGY | Facility: MEDICAL CENTER | Age: 48
End: 2018-11-27

## 2018-11-27 PROCEDURE — A9270 NON-COVERED ITEM OR SERVICE: HCPCS | Performed by: PSYCHIATRY & NEUROLOGY

## 2018-11-27 PROCEDURE — 700102 HCHG RX REV CODE 250 W/ 637 OVERRIDE(OP): Performed by: PSYCHIATRY & NEUROLOGY

## 2018-11-27 PROCEDURE — 95951 PR EEG MONITORING/VIDEORECORD: CPT | Mod: 26 | Performed by: PSYCHIATRY & NEUROLOGY

## 2018-11-27 PROCEDURE — 770020 HCHG ROOM/CARE - TELE (206)

## 2018-11-27 PROCEDURE — 302135 SEQUENTIAL COMPRESSION MACHINE: Performed by: PSYCHIATRY & NEUROLOGY

## 2018-11-27 PROCEDURE — 95951 HCHG EEG-VIDEO-24HR: CPT

## 2018-11-27 RX ADMIN — GABAPENTIN 300 MG: 300 CAPSULE ORAL at 22:08

## 2018-11-27 RX ADMIN — QUETIAPINE FUMARATE 50 MG: 25 TABLET ORAL at 16:58

## 2018-11-27 RX ADMIN — GABAPENTIN 300 MG: 300 CAPSULE ORAL at 06:16

## 2018-11-27 RX ADMIN — NALTREXONE HYDROCHLORIDE 50 MG: 50 TABLET, FILM COATED ORAL at 06:16

## 2018-11-27 RX ADMIN — PHENYTOIN SODIUM 300 MG: 100 CAPSULE ORAL at 22:08

## 2018-11-27 RX ADMIN — TRAZODONE HYDROCHLORIDE 100 MG: 100 TABLET ORAL at 22:08

## 2018-11-27 RX ADMIN — QUETIAPINE FUMARATE 400 MG: 200 TABLET ORAL at 22:08

## 2018-11-27 RX ADMIN — GABAPENTIN 300 MG: 300 CAPSULE ORAL at 10:39

## 2018-11-27 RX ADMIN — QUETIAPINE FUMARATE 50 MG: 25 TABLET ORAL at 06:16

## 2018-11-27 RX ADMIN — GABAPENTIN 300 MG: 300 CAPSULE ORAL at 16:58

## 2018-11-27 ASSESSMENT — ENCOUNTER SYMPTOMS
FALLS: 1
SEIZURES: 1
LOSS OF CONSCIOUSNESS: 1
FEVER: 0
DEPRESSION: 0
BLOOD IN STOOL: 0
EYE DISCHARGE: 0

## 2018-11-27 ASSESSMENT — PAIN SCALES - GENERAL
PAINLEVEL_OUTOF10: 0
PAINLEVEL_OUTOF10: 0

## 2018-11-27 NOTE — DISCHARGE PLANNING
Anticipated Discharge Disposition:   home    Action:   Wife helps him at home.   Pt has no dc concerns  Assessment completed.     Barriers to Discharge:   Medical clearance    Plan:  RN kindly notify CM if there are any discharge concerns.

## 2018-11-27 NOTE — PROGRESS NOTES
Received report from day shift RN. Assumed care of patient. Pt shows no s/sx of distress. Discussed plan of care for day with patient and received verbal understanding. Call light within reach, frame alarm active, bed in low position.

## 2018-11-27 NOTE — CARE PLAN
Problem: Communication  Goal: The ability to communicate needs accurately and effectively will improve    Intervention: [place] patient and significant other/support system to call light to alert staff of needs  Pt encouraged to use call light when needing to get OOB      Problem: Knowledge Deficit  Goal: Knowledge of disease process/condition, treatment plan, diagnostic tests, and medications will improve    Intervention: Assess knowledge level of disease process/condition, treatment plan, diagnostic tests, and medications  Pt educated on meds and POC.

## 2018-11-27 NOTE — CARE PLAN
Problem: Safety  Goal: Will remain free from injury  Outcome: PROGRESSING AS EXPECTED  Seizure precautions in place.     Problem: Infection  Goal: Will remain free from infection  Outcome: PROGRESSING AS EXPECTED

## 2018-11-27 NOTE — DISCHARGE PLANNING
Care Transition Team Assessment    Information Source  Orientation : Oriented x 4  Information Given By: Patient  Who is responsible for making decisions for patient? : Patient         Elopement Risk  Legal Hold: No  Ambulatory or Self Mobile in Wheelchair: Yes  Disoriented: No  Psychiatric Symptoms: None  History of Wandering: No  Elopement this Admit: No  Vocalizing Wanting to Leave: No  Displays Behaviors, Body Language Wanting to Leave: No-Not at Risk for Elopement    Interdisciplinary Discharge Planning  Does Admitting Nurse Feel This Could be a Complex Discharge?: No  Primary Care Physician: Dr. Harrison  Lives with - Patient's Self Care Capacity: Spouse  Patient or legal guardian wants to designate a caregiver (see row info): No  Support Systems: Spouse / Significant Other  Housing / Facility: [address]  Do You Take your Prescribed Medications Regularly: Yes  Able to Return to Previous ADL's: Yes  Mobility Issues: No  Prior Services: None  Patient Expects to be Discharged to:: Home  Assistance Needed: Yes  Durable Medical Equipment: Not Applicable    Discharge Preparedness  What is your plan after discharge?: Home with help  What are your discharge supports?: Spouse  Prior Functional Level: Ambulatory, Independent with Activities of Daily Living  Difficulity with ADLs: None  Difficulity with IADLs: Driving    Functional Assesment  Prior Functional Level: Ambulatory, Independent with Activities of Daily Living    Finances  Financial Barriers to Discharge: No  Prescription Coverage: Yes    Vision / Hearing Impairment  Vision Impairment : Yes  Right Eye Vision: Impaired, Wears Glasses  Left Eye Vision: Wears Glasses, Impaired    Values / Beliefs / Concerns  Values / Beliefs Concerns : No  Special Hospitalization Concerns: none         Domestic Abuse  Have you ever been the victim of abuse or violence?: No  Physical Abuse or Sexual Abuse: No  Verbal Abuse or Emotional Abuse: No    Psychological  Assessment  History of Substance Abuse: None         Anticipated Discharge Information  Anticipated discharge disposition: Home

## 2018-11-27 NOTE — PROGRESS NOTES
Received report from night shift RN. Assumed care of patient. Pt assessed and stable. VSS. Patient reports 0/10 pain at this time and denies seizure like activity.  Discussed plan of care for day with patient and received verbal understanding. Call light within reach, strip alarm active, bed in low position.  seizure precautions in place.

## 2018-11-28 PROCEDURE — 770020 HCHG ROOM/CARE - TELE (206)

## 2018-11-28 PROCEDURE — A9270 NON-COVERED ITEM OR SERVICE: HCPCS | Performed by: PSYCHIATRY & NEUROLOGY

## 2018-11-28 PROCEDURE — 700102 HCHG RX REV CODE 250 W/ 637 OVERRIDE(OP): Performed by: PSYCHIATRY & NEUROLOGY

## 2018-11-28 PROCEDURE — 95951 PR EEG MONITORING/VIDEORECORD: CPT | Mod: 26 | Performed by: PSYCHIATRY & NEUROLOGY

## 2018-11-28 PROCEDURE — 95951 HCHG EEG-VIDEO-24HR: CPT

## 2018-11-28 RX ADMIN — TRAZODONE HYDROCHLORIDE 100 MG: 100 TABLET ORAL at 21:55

## 2018-11-28 RX ADMIN — QUETIAPINE FUMARATE 50 MG: 25 TABLET ORAL at 18:47

## 2018-11-28 RX ADMIN — NALTREXONE HYDROCHLORIDE 50 MG: 50 TABLET, FILM COATED ORAL at 06:17

## 2018-11-28 RX ADMIN — GABAPENTIN 300 MG: 300 CAPSULE ORAL at 18:47

## 2018-11-28 RX ADMIN — QUETIAPINE FUMARATE 400 MG: 200 TABLET ORAL at 21:55

## 2018-11-28 RX ADMIN — QUETIAPINE FUMARATE 50 MG: 25 TABLET ORAL at 06:17

## 2018-11-28 RX ADMIN — GABAPENTIN 300 MG: 300 CAPSULE ORAL at 12:05

## 2018-11-28 RX ADMIN — GABAPENTIN 300 MG: 300 CAPSULE ORAL at 21:55

## 2018-11-28 RX ADMIN — PHENYTOIN SODIUM 300 MG: 100 CAPSULE ORAL at 21:55

## 2018-11-28 RX ADMIN — GABAPENTIN 300 MG: 300 CAPSULE ORAL at 06:17

## 2018-11-28 ASSESSMENT — ENCOUNTER SYMPTOMS
FALLS: 1
EYE DISCHARGE: 0
FEVER: 0
DEPRESSION: 0
LOSS OF CONSCIOUSNESS: 1
BLOOD IN STOOL: 0
SEIZURES: 1

## 2018-11-28 ASSESSMENT — PAIN SCALES - GENERAL
PAINLEVEL_OUTOF10: 0
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
## 2018-11-28 NOTE — PROGRESS NOTES
Monitor summary: SB/SR 55-65, MI .16, QRS .08, QT .40 with R PAC's and HR to 44  per strip from monitor room.

## 2018-11-28 NOTE — PROGRESS NOTES
Received report and assumed pt care.  A&O x4.  Bed frame alarm and treaded socks on.  Call light and personal belongings within reach.  Bed locked and at lowest position.  Denies pain.  CAMACHO.  VSS.  Tele and EEG monitoring in use.  Seizure precautions in place.

## 2018-11-28 NOTE — PROGRESS NOTES
Monitor Summary: SB 58-SR 66, MA .16, QRS .08, QT .40 with occasional/rare PAC per strip from monitor room

## 2018-11-29 PROCEDURE — 770020 HCHG ROOM/CARE - TELE (206)

## 2018-11-29 PROCEDURE — 95951 PR EEG MONITORING/VIDEORECORD: CPT | Mod: 26 | Performed by: PSYCHIATRY & NEUROLOGY

## 2018-11-29 PROCEDURE — A9270 NON-COVERED ITEM OR SERVICE: HCPCS | Performed by: PSYCHIATRY & NEUROLOGY

## 2018-11-29 PROCEDURE — 95951 HCHG EEG-VIDEO-24HR: CPT | Performed by: PSYCHIATRY & NEUROLOGY

## 2018-11-29 PROCEDURE — 700102 HCHG RX REV CODE 250 W/ 637 OVERRIDE(OP): Performed by: PSYCHIATRY & NEUROLOGY

## 2018-11-29 RX ORDER — DIVALPROEX SODIUM 500 MG/1
500 TABLET, EXTENDED RELEASE ORAL DAILY
Status: DISCONTINUED | OUTPATIENT
Start: 2018-11-29 | End: 2018-11-30 | Stop reason: HOSPADM

## 2018-11-29 RX ADMIN — GABAPENTIN 300 MG: 300 CAPSULE ORAL at 21:57

## 2018-11-29 RX ADMIN — GABAPENTIN 300 MG: 300 CAPSULE ORAL at 12:02

## 2018-11-29 RX ADMIN — QUETIAPINE FUMARATE 50 MG: 25 TABLET ORAL at 17:37

## 2018-11-29 RX ADMIN — NALTREXONE HYDROCHLORIDE 50 MG: 50 TABLET, FILM COATED ORAL at 06:00

## 2018-11-29 RX ADMIN — DIVALPROEX SODIUM 500 MG: 500 TABLET, EXTENDED RELEASE ORAL at 21:57

## 2018-11-29 RX ADMIN — GABAPENTIN 300 MG: 300 CAPSULE ORAL at 06:00

## 2018-11-29 RX ADMIN — QUETIAPINE FUMARATE 50 MG: 25 TABLET ORAL at 06:00

## 2018-11-29 RX ADMIN — GABAPENTIN 300 MG: 300 CAPSULE ORAL at 17:36

## 2018-11-29 RX ADMIN — TRAZODONE HYDROCHLORIDE 100 MG: 100 TABLET ORAL at 21:57

## 2018-11-29 RX ADMIN — QUETIAPINE FUMARATE 400 MG: 200 TABLET ORAL at 21:58

## 2018-11-29 RX ADMIN — PHENYTOIN SODIUM 300 MG: 100 CAPSULE ORAL at 21:57

## 2018-11-29 ASSESSMENT — ENCOUNTER SYMPTOMS
SEIZURES: 1
LOSS OF CONSCIOUSNESS: 1
FALLS: 1
BLOOD IN STOOL: 0
FEVER: 0
EYE DISCHARGE: 0
DEPRESSION: 0

## 2018-11-29 ASSESSMENT — PAIN SCALES - GENERAL
PAINLEVEL_OUTOF10: 0
PAINLEVEL_OUTOF10: 0
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
## 2018-11-29 NOTE — PROGRESS NOTES
Bedside rounding complete. Patient sleeping. Pt on continuous EEG monitoring. Frame alarm on, upper side rails up, bed locked and in lowest position. Call bell and belongings within reach. Communication board updated.

## 2018-11-29 NOTE — CARE PLAN
Problem: Safety  Goal: Will remain free from falls    Intervention: Assess risk factors for falls  Frame alarm on. Pt on continuous video monitoring. Pt calls appropriately. 4 side rails padded, bed in lowest and locked position.       Problem: Venous Thromboembolism (VTW)/Deep Vein Thrombosis (DVT) Prevention:  Goal: Patient will participate in Venous Thrombosis (VTE)/Deep Vein Thrombosis (DVT)Prevention Measures    Intervention: Assess and monitor for anticoagulation complications  SCDs in place.

## 2018-11-30 VITALS
RESPIRATION RATE: 16 BRPM | HEIGHT: 75 IN | SYSTOLIC BLOOD PRESSURE: 126 MMHG | TEMPERATURE: 97.7 F | HEART RATE: 63 BPM | WEIGHT: 178.13 LBS | BODY MASS INDEX: 22.15 KG/M2 | OXYGEN SATURATION: 96 % | DIASTOLIC BLOOD PRESSURE: 71 MMHG

## 2018-11-30 DIAGNOSIS — Z98.890 STATUS POST MEDIAL MENISCUS REPAIR: ICD-10-CM

## 2018-11-30 DIAGNOSIS — F99 PSYCHIATRIC DISORDER: ICD-10-CM

## 2018-11-30 DIAGNOSIS — K04.7 DENTAL ABSCESS: ICD-10-CM

## 2018-11-30 DIAGNOSIS — S62.354D: ICD-10-CM

## 2018-11-30 DIAGNOSIS — G40.909 SEIZURE DISORDER (HCC): ICD-10-CM

## 2018-11-30 PROCEDURE — 95951 EEG: CPT | Mod: 52 | Performed by: PSYCHIATRY & NEUROLOGY

## 2018-11-30 PROCEDURE — 700102 HCHG RX REV CODE 250 W/ 637 OVERRIDE(OP): Performed by: PSYCHIATRY & NEUROLOGY

## 2018-11-30 PROCEDURE — 95951 EEG: CPT | Mod: 26,52 | Performed by: PSYCHIATRY & NEUROLOGY

## 2018-11-30 PROCEDURE — A9270 NON-COVERED ITEM OR SERVICE: HCPCS | Performed by: PSYCHIATRY & NEUROLOGY

## 2018-11-30 PROCEDURE — 95951 HCHG EEG-VIDEO-24HR: CPT | Mod: 52

## 2018-11-30 RX ORDER — DIVALPROEX SODIUM 500 MG/1
500 TABLET, EXTENDED RELEASE ORAL DAILY
Qty: 30 TAB | Refills: 5 | Status: SHIPPED | OUTPATIENT
Start: 2018-12-01 | End: 2018-11-30 | Stop reason: SDUPTHER

## 2018-11-30 RX ORDER — DIVALPROEX SODIUM 500 MG/1
500 TABLET, EXTENDED RELEASE ORAL DAILY
Qty: 30 TAB | Refills: 5 | Status: SHIPPED | OUTPATIENT
Start: 2018-12-01 | End: 2019-01-21 | Stop reason: SDUPTHER

## 2018-11-30 RX ADMIN — NALTREXONE HYDROCHLORIDE 50 MG: 50 TABLET, FILM COATED ORAL at 05:23

## 2018-11-30 RX ADMIN — GABAPENTIN 300 MG: 300 CAPSULE ORAL at 05:23

## 2018-11-30 RX ADMIN — QUETIAPINE FUMARATE 50 MG: 25 TABLET ORAL at 05:23

## 2018-11-30 ASSESSMENT — PAIN SCALES - GENERAL: PAINLEVEL_OUTOF10: 0
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
## 2018-11-30 NOTE — DISCHARGE INSTRUCTIONS
Discharge Instructions    Discharged to home by car with relative. Discharged via wheelchair, hospital escort: Yes.  Special equipment needed: Not Applicable    Be sure to schedule a follow-up appointment with your primary care doctor or any specialists as instructed.     Discharge Plan:   Smoking Cessation Offered: Patient Refused  Influenza Vaccine Indication: Patient Refuses    I understand that a diet low in cholesterol, fat, and sodium is recommended for good health. Unless I have been given specific instructions below for another diet, I accept this instruction as my diet prescription.   Other diet: Regular    Special Instructions:   Take medications as prescribed  No driving unless cleared by MD  Follow up with Dr. Mooney in 2 months    · Is patient discharged on Warfarin / Coumadin?   No     Depression / Suicide Risk    As you are discharged from this [facility], it is important to learn how to keep safe from harming yourself.    Recognize the warning signs:  · Abrupt changes in personality, positive or negative- including increase in energy   · Giving away possessions  · Change in eating patterns- significant weight changes-  positive or negative  · Change in sleeping patterns- unable to sleep or sleeping all the time   · Unwillingness or inability to communicate  · Depression  · Unusual sadness, discouragement and loneliness  · Talk of wanting to die  · Neglect of personal appearance   · Rebelliousness- reckless behavior  · Withdrawal from people/activities they love  · Confusion- inability to concentrate     If you or a loved one observes any of these behaviors or has concerns about self-harm, here's what you can do:  · Talk about it- your feelings and reasons for harming yourself  · Remove any means that you might use to hurt yourself (examples: pills, rope, extension cords, firearm)  · Get professional help from the community (Mental Health, Substance Abuse, psychological counseling)  · Do  not be alone:Call your Safe Contact- someone whom you trust who will be there for you.  · Call your local CRISIS HOTLINE 018-1356 or 509-866-1212  · Call your local Children's Mobile Crisis Response Team [place] (735) 767-2768 or www.L99.com  · Call the toll free National Suicide Prevention Hotlines   · National Suicide Prevention Lifeline 611-430-KSXP (8144)  · National [employer] Network 800-SUICIDE (187-4534)
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
## 2019-01-21 ENCOUNTER — OFFICE VISIT (OUTPATIENT)
Dept: NEUROLOGY | Facility: MEDICAL CENTER | Age: 49
End: 2019-01-21
Payer: MEDICAID

## 2019-01-21 VITALS
RESPIRATION RATE: 16 BRPM | OXYGEN SATURATION: 94 % | TEMPERATURE: 99.3 F | BODY MASS INDEX: 22.88 KG/M2 | DIASTOLIC BLOOD PRESSURE: 60 MMHG | HEIGHT: 75 IN | WEIGHT: 184 LBS | SYSTOLIC BLOOD PRESSURE: 106 MMHG | HEART RATE: 67 BPM

## 2019-01-21 DIAGNOSIS — K04.7 DENTAL ABSCESS: ICD-10-CM

## 2019-01-21 DIAGNOSIS — F99 PSYCHIATRIC DISORDER: ICD-10-CM

## 2019-01-21 DIAGNOSIS — Z98.890 STATUS POST MEDIAL MENISCUS REPAIR: ICD-10-CM

## 2019-01-21 DIAGNOSIS — S62.354D: ICD-10-CM

## 2019-01-21 DIAGNOSIS — G40.909 SEIZURE DISORDER (HCC): ICD-10-CM

## 2019-01-21 PROCEDURE — 99214 OFFICE O/P EST MOD 30 MIN: CPT | Performed by: PSYCHIATRY & NEUROLOGY

## 2019-01-21 RX ORDER — GABAPENTIN 300 MG/1
300 CAPSULE ORAL 4 TIMES DAILY
Qty: 360 CAP | Refills: 1 | Status: SHIPPED | OUTPATIENT
Start: 2019-01-21 | End: 2019-04-25 | Stop reason: SDUPTHER

## 2019-01-21 RX ORDER — DIVALPROEX SODIUM 500 MG/1
500 TABLET, EXTENDED RELEASE ORAL 2 TIMES DAILY
Qty: 180 TAB | Refills: 1 | Status: SHIPPED | OUTPATIENT
Start: 2019-01-21 | End: 2019-07-22

## 2019-01-21 ASSESSMENT — PATIENT HEALTH QUESTIONNAIRE - PHQ9
5. POOR APPETITE OR OVEREATING: 3 - NEARLY EVERY DAY
CLINICAL INTERPRETATION OF PHQ2 SCORE: 1
SUM OF ALL RESPONSES TO PHQ QUESTIONS 1-9: 8
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
## 2019-07-22 ENCOUNTER — OFFICE VISIT (OUTPATIENT)
Dept: NEUROLOGY | Facility: MEDICAL CENTER | Age: 49
End: 2019-07-22
Payer: MEDICAID

## 2019-07-22 VITALS
RESPIRATION RATE: 18 BRPM | WEIGHT: 181 LBS | OXYGEN SATURATION: 98 % | DIASTOLIC BLOOD PRESSURE: 80 MMHG | TEMPERATURE: 97.3 F | HEART RATE: 68 BPM | BODY MASS INDEX: 22.5 KG/M2 | SYSTOLIC BLOOD PRESSURE: 128 MMHG | HEIGHT: 75 IN

## 2019-07-22 DIAGNOSIS — G40.109 LOCALIZATION-RELATED EPILEPSY (HCC): ICD-10-CM

## 2019-07-22 DIAGNOSIS — Z13.31 SCREENING FOR DEPRESSION: ICD-10-CM

## 2019-07-22 DIAGNOSIS — T42.75XA ADVERSE EFFECT OF ANTIEPILEPTIC, INITIAL ENCOUNTER: ICD-10-CM

## 2019-07-22 DIAGNOSIS — E55.9 VITAMIN D DEFICIENCY: ICD-10-CM

## 2019-07-22 DIAGNOSIS — F39 MOOD DISORDER (HCC): ICD-10-CM

## 2019-07-22 PROCEDURE — 99205 OFFICE O/P NEW HI 60 MIN: CPT | Performed by: NURSE PRACTITIONER

## 2019-07-22 RX ORDER — LAMOTRIGINE 25 MG/1
100 TABLET ORAL 2 TIMES DAILY
Qty: 240 TAB | Refills: 5 | Status: SHIPPED | OUTPATIENT
Start: 2019-07-22 | End: 2019-09-27

## 2019-07-22 RX ORDER — GABAPENTIN 300 MG/1
300 CAPSULE ORAL
COMMUNITY
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
## 2019-07-22 NOTE — PATIENT INSTRUCTIONS
Lamotrgine: Start taking 1 tab twice a day for a week, then take 1 tab in am and 2 tabs at night for another week, then 2 tabs in am and 2 tabs at night for another week, then 2 tabs in am and 3 tabs at night for another week, then 3 tabs twice a day for another week, then 3tabs in am and 4 tabs at night for another week, then 4 tabs twice a day thereafter.

## 2019-09-27 ENCOUNTER — APPOINTMENT (OUTPATIENT)
Dept: CARDIOLOGY | Facility: MEDICAL CENTER | Age: 49
End: 2019-09-27
Attending: INTERNAL MEDICINE
Payer: MEDICAID

## 2019-09-27 ENCOUNTER — HOSPITAL ENCOUNTER (OUTPATIENT)
Facility: MEDICAL CENTER | Age: 49
End: 2019-09-28
Attending: EMERGENCY MEDICINE | Admitting: INTERNAL MEDICINE
Payer: MEDICAID

## 2019-09-27 ENCOUNTER — APPOINTMENT (OUTPATIENT)
Dept: RADIOLOGY | Facility: MEDICAL CENTER | Age: 49
End: 2019-09-27
Attending: EMERGENCY MEDICINE
Payer: MEDICAID

## 2019-09-27 PROBLEM — R07.9 PAIN IN THE CHEST: Status: ACTIVE | Noted: 2019-09-27

## 2019-09-27 PROBLEM — F31.9 BIPOLAR DISORDER (HCC): Status: ACTIVE | Noted: 2019-09-27

## 2019-09-27 LAB
ALBUMIN SERPL BCP-MCNC: 5.1 G/DL (ref 3.2–4.9)
ALBUMIN/GLOB SERPL: 2.2 G/DL
ALP SERPL-CCNC: 61 U/L (ref 30–99)
ALT SERPL-CCNC: 12 U/L (ref 2–50)
AMPHET UR QL SCN: NEGATIVE
ANION GAP SERPL CALC-SCNC: 10 MMOL/L (ref 0–11.9)
AST SERPL-CCNC: 13 U/L (ref 12–45)
BARBITURATES UR QL SCN: NEGATIVE
BASOPHILS # BLD AUTO: 0.7 % (ref 0–1.8)
BASOPHILS # BLD: 0.05 K/UL (ref 0–0.12)
BENZODIAZ UR QL SCN: NEGATIVE
BILIRUB SERPL-MCNC: 0.6 MG/DL (ref 0.1–1.5)
BUN SERPL-MCNC: 12 MG/DL (ref 8–22)
BZE UR QL SCN: NEGATIVE
CALCIUM SERPL-MCNC: 9.9 MG/DL (ref 8.5–10.5)
CANNABINOIDS UR QL SCN: POSITIVE
CHLORIDE SERPL-SCNC: 106 MMOL/L (ref 96–112)
CHOLEST SERPL-MCNC: 192 MG/DL (ref 100–199)
CO2 SERPL-SCNC: 24 MMOL/L (ref 20–33)
CREAT SERPL-MCNC: 1.28 MG/DL (ref 0.5–1.4)
D DIMER PPP IA.FEU-MCNC: <0.4 UG/ML (FEU) (ref 0–0.5)
EKG IMPRESSION: NORMAL
EOSINOPHIL # BLD AUTO: 0.04 K/UL (ref 0–0.51)
EOSINOPHIL NFR BLD: 0.6 % (ref 0–6.9)
ERYTHROCYTE [DISTWIDTH] IN BLOOD BY AUTOMATED COUNT: 48.6 FL (ref 35.9–50)
EST. AVERAGE GLUCOSE BLD GHB EST-MCNC: 114 MG/DL
GLOBULIN SER CALC-MCNC: 2.3 G/DL (ref 1.9–3.5)
GLUCOSE SERPL-MCNC: 89 MG/DL (ref 65–99)
HBA1C MFR BLD: 5.6 % (ref 0–5.6)
HCT VFR BLD AUTO: 54.3 % (ref 42–52)
HDLC SERPL-MCNC: 61 MG/DL
HGB BLD-MCNC: 18.1 G/DL (ref 14–18)
IMM GRANULOCYTES # BLD AUTO: 0.02 K/UL (ref 0–0.11)
IMM GRANULOCYTES NFR BLD AUTO: 0.3 % (ref 0–0.9)
LDLC SERPL CALC-MCNC: 114 MG/DL
LV EJECT FRACT  99904: 65
LV EJECT FRACT MOD 2C 99903: 53.48
LV EJECT FRACT MOD 4C 99902: 71.95
LV EJECT FRACT MOD BP 99901: 66.06
LYMPHOCYTES # BLD AUTO: 2.18 K/UL (ref 1–4.8)
LYMPHOCYTES NFR BLD: 31.5 % (ref 22–41)
MAGNESIUM SERPL-MCNC: 2.3 MG/DL (ref 1.5–2.5)
MCH RBC QN AUTO: 32 PG (ref 27–33)
MCHC RBC AUTO-ENTMCNC: 33.3 G/DL (ref 33.7–35.3)
MCV RBC AUTO: 95.9 FL (ref 81.4–97.8)
METHADONE UR QL SCN: NEGATIVE
MONOCYTES # BLD AUTO: 0.85 K/UL (ref 0–0.85)
MONOCYTES NFR BLD AUTO: 12.3 % (ref 0–13.4)
NEUTROPHILS # BLD AUTO: 3.77 K/UL (ref 1.82–7.42)
NEUTROPHILS NFR BLD: 54.6 % (ref 44–72)
NRBC # BLD AUTO: 0 K/UL
NRBC BLD-RTO: 0 /100 WBC
OPIATES UR QL SCN: NEGATIVE
OXYCODONE UR QL SCN: NEGATIVE
PCP UR QL SCN: NEGATIVE
PLATELET # BLD AUTO: 346 K/UL (ref 164–446)
PMV BLD AUTO: 9 FL (ref 9–12.9)
POTASSIUM SERPL-SCNC: 4 MMOL/L (ref 3.6–5.5)
PROPOXYPH UR QL SCN: NEGATIVE
PROT SERPL-MCNC: 7.4 G/DL (ref 6–8.2)
RBC # BLD AUTO: 5.66 M/UL (ref 4.7–6.1)
SODIUM SERPL-SCNC: 140 MMOL/L (ref 135–145)
TRIGL SERPL-MCNC: 86 MG/DL (ref 0–149)
TROPONIN T SERPL-MCNC: 8 NG/L (ref 6–19)
TROPONIN T SERPL-MCNC: <6 NG/L (ref 6–19)
TROPONIN T SERPL-MCNC: <6 NG/L (ref 6–19)
WBC # BLD AUTO: 6.9 K/UL (ref 4.8–10.8)

## 2019-09-27 PROCEDURE — 93306 TTE W/DOPPLER COMPLETE: CPT | Mod: 26 | Performed by: INTERNAL MEDICINE

## 2019-09-27 PROCEDURE — G0378 HOSPITAL OBSERVATION PER HR: HCPCS

## 2019-09-27 PROCEDURE — 83735 ASSAY OF MAGNESIUM: CPT

## 2019-09-27 PROCEDURE — 99285 EMERGENCY DEPT VISIT HI MDM: CPT

## 2019-09-27 PROCEDURE — 84484 ASSAY OF TROPONIN QUANT: CPT

## 2019-09-27 PROCEDURE — 83036 HEMOGLOBIN GLYCOSYLATED A1C: CPT

## 2019-09-27 PROCEDURE — 80307 DRUG TEST PRSMV CHEM ANLYZR: CPT

## 2019-09-27 PROCEDURE — 93005 ELECTROCARDIOGRAM TRACING: CPT

## 2019-09-27 PROCEDURE — 96374 THER/PROPH/DIAG INJ IV PUSH: CPT

## 2019-09-27 PROCEDURE — 85379 FIBRIN DEGRADATION QUANT: CPT

## 2019-09-27 PROCEDURE — 700102 HCHG RX REV CODE 250 W/ 637 OVERRIDE(OP): Performed by: EMERGENCY MEDICINE

## 2019-09-27 PROCEDURE — 80061 LIPID PANEL: CPT

## 2019-09-27 PROCEDURE — 99220 PR INITIAL OBSERVATION CARE,LEVL III: CPT | Performed by: INTERNAL MEDICINE

## 2019-09-27 PROCEDURE — 36415 COLL VENOUS BLD VENIPUNCTURE: CPT

## 2019-09-27 PROCEDURE — 93005 ELECTROCARDIOGRAM TRACING: CPT | Performed by: EMERGENCY MEDICINE

## 2019-09-27 PROCEDURE — 85025 COMPLETE CBC W/AUTO DIFF WBC: CPT

## 2019-09-27 PROCEDURE — 700102 HCHG RX REV CODE 250 W/ 637 OVERRIDE(OP): Performed by: INTERNAL MEDICINE

## 2019-09-27 PROCEDURE — A9270 NON-COVERED ITEM OR SERVICE: HCPCS | Performed by: INTERNAL MEDICINE

## 2019-09-27 PROCEDURE — A9270 NON-COVERED ITEM OR SERVICE: HCPCS | Performed by: EMERGENCY MEDICINE

## 2019-09-27 PROCEDURE — 71045 X-RAY EXAM CHEST 1 VIEW: CPT

## 2019-09-27 PROCEDURE — 80053 COMPREHEN METABOLIC PANEL: CPT

## 2019-09-27 PROCEDURE — 93306 TTE W/DOPPLER COMPLETE: CPT

## 2019-09-27 PROCEDURE — 700111 HCHG RX REV CODE 636 W/ 250 OVERRIDE (IP): Performed by: INTERNAL MEDICINE

## 2019-09-27 RX ORDER — BISACODYL 10 MG
10 SUPPOSITORY, RECTAL RECTAL
Status: DISCONTINUED | OUTPATIENT
Start: 2019-09-27 | End: 2019-09-28 | Stop reason: HOSPADM

## 2019-09-27 RX ORDER — TRAZODONE HYDROCHLORIDE 100 MG/1
100 TABLET ORAL NIGHTLY
Status: DISCONTINUED | OUTPATIENT
Start: 2019-09-27 | End: 2019-09-28 | Stop reason: HOSPADM

## 2019-09-27 RX ORDER — LABETALOL HYDROCHLORIDE 5 MG/ML
10 INJECTION, SOLUTION INTRAVENOUS EVERY 4 HOURS PRN
Status: DISCONTINUED | OUTPATIENT
Start: 2019-09-27 | End: 2019-09-28 | Stop reason: HOSPADM

## 2019-09-27 RX ORDER — ONDANSETRON 2 MG/ML
4 INJECTION INTRAMUSCULAR; INTRAVENOUS EVERY 4 HOURS PRN
Status: DISCONTINUED | OUTPATIENT
Start: 2019-09-27 | End: 2019-09-28 | Stop reason: HOSPADM

## 2019-09-27 RX ORDER — ASPIRIN 325 MG
325 TABLET ORAL ONCE
Status: COMPLETED | OUTPATIENT
Start: 2019-09-27 | End: 2019-09-27

## 2019-09-27 RX ORDER — CLONIDINE HYDROCHLORIDE 0.1 MG/1
0.1 TABLET ORAL EVERY 6 HOURS PRN
Status: DISCONTINUED | OUTPATIENT
Start: 2019-09-27 | End: 2019-09-28 | Stop reason: HOSPADM

## 2019-09-27 RX ORDER — PROCHLORPERAZINE EDISYLATE 5 MG/ML
5-10 INJECTION INTRAMUSCULAR; INTRAVENOUS EVERY 4 HOURS PRN
Status: DISCONTINUED | OUTPATIENT
Start: 2019-09-27 | End: 2019-09-28 | Stop reason: HOSPADM

## 2019-09-27 RX ORDER — KETOROLAC TROMETHAMINE 30 MG/ML
30 INJECTION, SOLUTION INTRAMUSCULAR; INTRAVENOUS EVERY 6 HOURS PRN
Status: DISCONTINUED | OUTPATIENT
Start: 2019-09-27 | End: 2019-09-28 | Stop reason: HOSPADM

## 2019-09-27 RX ORDER — POLYETHYLENE GLYCOL 3350 17 G/17G
1 POWDER, FOR SOLUTION ORAL
Status: DISCONTINUED | OUTPATIENT
Start: 2019-09-27 | End: 2019-09-28 | Stop reason: HOSPADM

## 2019-09-27 RX ORDER — NITROGLYCERIN 0.4 MG/1
0.4 TABLET SUBLINGUAL
Status: DISCONTINUED | OUTPATIENT
Start: 2019-09-27 | End: 2019-09-28 | Stop reason: HOSPADM

## 2019-09-27 RX ORDER — AMINOPHYLLINE 25 MG/ML
100 INJECTION, SOLUTION INTRAVENOUS
Status: DISCONTINUED | OUTPATIENT
Start: 2019-09-27 | End: 2019-09-28 | Stop reason: HOSPADM

## 2019-09-27 RX ORDER — CYCLOBENZAPRINE HCL 10 MG
10 TABLET ORAL 3 TIMES DAILY PRN
Status: DISCONTINUED | OUTPATIENT
Start: 2019-09-27 | End: 2019-09-28 | Stop reason: HOSPADM

## 2019-09-27 RX ORDER — ACETAMINOPHEN 325 MG/1
650 TABLET ORAL EVERY 6 HOURS PRN
Status: DISCONTINUED | OUTPATIENT
Start: 2019-09-27 | End: 2019-09-28 | Stop reason: HOSPADM

## 2019-09-27 RX ORDER — ASPIRIN 300 MG/1
300 SUPPOSITORY RECTAL DAILY
Status: DISCONTINUED | OUTPATIENT
Start: 2019-09-28 | End: 2019-09-28 | Stop reason: HOSPADM

## 2019-09-27 RX ORDER — QUETIAPINE FUMARATE 200 MG/1
400 TABLET, FILM COATED ORAL EVERY EVENING
Status: DISCONTINUED | OUTPATIENT
Start: 2019-09-27 | End: 2019-09-28 | Stop reason: HOSPADM

## 2019-09-27 RX ORDER — ASPIRIN 81 MG/1
324 TABLET, CHEWABLE ORAL DAILY
Status: DISCONTINUED | OUTPATIENT
Start: 2019-09-28 | End: 2019-09-28 | Stop reason: HOSPADM

## 2019-09-27 RX ORDER — AMOXICILLIN 250 MG
2 CAPSULE ORAL 2 TIMES DAILY
Status: DISCONTINUED | OUTPATIENT
Start: 2019-09-27 | End: 2019-09-28 | Stop reason: HOSPADM

## 2019-09-27 RX ORDER — ENALAPRILAT 1.25 MG/ML
1.25 INJECTION INTRAVENOUS EVERY 6 HOURS PRN
Status: DISCONTINUED | OUTPATIENT
Start: 2019-09-27 | End: 2019-09-28 | Stop reason: HOSPADM

## 2019-09-27 RX ORDER — ALUMINA, MAGNESIA, AND SIMETHICONE 2400; 2400; 240 MG/30ML; MG/30ML; MG/30ML
30 SUSPENSION ORAL EVERY 4 HOURS PRN
Status: DISCONTINUED | OUTPATIENT
Start: 2019-09-27 | End: 2019-09-28 | Stop reason: HOSPADM

## 2019-09-27 RX ORDER — ZOLPIDEM TARTRATE 5 MG/1
10 TABLET ORAL NIGHTLY PRN
Status: DISCONTINUED | OUTPATIENT
Start: 2019-09-27 | End: 2019-09-28 | Stop reason: HOSPADM

## 2019-09-27 RX ORDER — LAMOTRIGINE 25 MG/1
100 TABLET ORAL 2 TIMES DAILY
COMMUNITY

## 2019-09-27 RX ORDER — REGADENOSON 0.08 MG/ML
0.4 INJECTION, SOLUTION INTRAVENOUS
Status: DISCONTINUED | OUTPATIENT
Start: 2019-09-27 | End: 2019-09-28 | Stop reason: HOSPADM

## 2019-09-27 RX ORDER — PROMETHAZINE HYDROCHLORIDE 25 MG/1
12.5-25 TABLET ORAL EVERY 4 HOURS PRN
Status: DISCONTINUED | OUTPATIENT
Start: 2019-09-27 | End: 2019-09-28 | Stop reason: HOSPADM

## 2019-09-27 RX ORDER — GABAPENTIN 300 MG/1
300 CAPSULE ORAL 4 TIMES DAILY
Status: DISCONTINUED | OUTPATIENT
Start: 2019-09-27 | End: 2019-09-28 | Stop reason: HOSPADM

## 2019-09-27 RX ORDER — ASPIRIN 325 MG
325 TABLET ORAL DAILY
Status: DISCONTINUED | OUTPATIENT
Start: 2019-09-28 | End: 2019-09-28 | Stop reason: HOSPADM

## 2019-09-27 RX ORDER — QUETIAPINE FUMARATE 25 MG/1
50 TABLET, FILM COATED ORAL 2 TIMES DAILY
Status: DISCONTINUED | OUTPATIENT
Start: 2019-09-28 | End: 2019-09-28 | Stop reason: HOSPADM

## 2019-09-27 RX ORDER — LAMOTRIGINE 100 MG/1
100 TABLET ORAL 2 TIMES DAILY
Status: DISCONTINUED | OUTPATIENT
Start: 2019-09-27 | End: 2019-09-28 | Stop reason: HOSPADM

## 2019-09-27 RX ORDER — NALTREXONE HYDROCHLORIDE 50 MG/1
50 TABLET, FILM COATED ORAL EVERY MORNING
Status: DISCONTINUED | OUTPATIENT
Start: 2019-09-27 | End: 2019-09-28 | Stop reason: HOSPADM

## 2019-09-27 RX ORDER — PROMETHAZINE HYDROCHLORIDE 25 MG/1
12.5-25 SUPPOSITORY RECTAL EVERY 4 HOURS PRN
Status: DISCONTINUED | OUTPATIENT
Start: 2019-09-27 | End: 2019-09-28 | Stop reason: HOSPADM

## 2019-09-27 RX ORDER — ONDANSETRON 4 MG/1
4 TABLET, ORALLY DISINTEGRATING ORAL EVERY 4 HOURS PRN
Status: DISCONTINUED | OUTPATIENT
Start: 2019-09-27 | End: 2019-09-28 | Stop reason: HOSPADM

## 2019-09-27 RX ADMIN — GABAPENTIN 300 MG: 300 CAPSULE ORAL at 16:15

## 2019-09-27 RX ADMIN — TRAZODONE HYDROCHLORIDE 100 MG: 100 TABLET ORAL at 20:56

## 2019-09-27 RX ADMIN — KETOROLAC TROMETHAMINE 30 MG: 30 INJECTION, SOLUTION INTRAMUSCULAR at 16:15

## 2019-09-27 RX ADMIN — GABAPENTIN 300 MG: 300 CAPSULE ORAL at 20:56

## 2019-09-27 RX ADMIN — ASPIRIN 325 MG: 325 TABLET, FILM COATED ORAL at 13:01

## 2019-09-27 RX ADMIN — NALTREXONE HYDROCHLORIDE 50 MG: 50 TABLET, FILM COATED ORAL at 17:07

## 2019-09-27 RX ADMIN — CYCLOBENZAPRINE 10 MG: 10 TABLET, FILM COATED ORAL at 17:07

## 2019-09-27 RX ADMIN — QUETIAPINE FUMARATE 400 MG: 200 TABLET ORAL at 17:07

## 2019-09-27 RX ADMIN — LAMOTRIGINE 100 MG: 100 TABLET ORAL at 17:06

## 2019-09-27 ASSESSMENT — COPD QUESTIONNAIRES
COPD SCREENING SCORE: 0
HAVE YOU SMOKED AT LEAST 100 CIGARETTES IN YOUR ENTIRE LIFE: NO/DON'T KNOW
DURING THE PAST 4 WEEKS HOW MUCH DID YOU FEEL SHORT OF BREATH: NONE/LITTLE OF THE TIME
DO YOU EVER COUGH UP ANY MUCUS OR PHLEGM?: NO/ONLY WITH OCCASIONAL COLDS OR INFECTIONS

## 2019-09-27 ASSESSMENT — PATIENT HEALTH QUESTIONNAIRE - PHQ9
SUM OF ALL RESPONSES TO PHQ9 QUESTIONS 1 AND 2: 0
1. LITTLE INTEREST OR PLEASURE IN DOING THINGS: NOT AT ALL
2. FEELING DOWN, DEPRESSED, IRRITABLE, OR HOPELESS: NOT AT ALL

## 2019-09-27 ASSESSMENT — LIFESTYLE VARIABLES
EVER HAD A DRINK FIRST THING IN THE MORNING TO STEADY YOUR NERVES TO GET RID OF A HANGOVER: NO
TOTAL SCORE: 0
ON A TYPICAL DAY WHEN YOU DRINK ALCOHOL HOW MANY DRINKS DO YOU HAVE: 0
TOTAL SCORE: 0
EVER_SMOKED: YES
EVER FELT BAD OR GUILTY ABOUT YOUR DRINKING: NO
CONSUMPTION TOTAL: NEGATIVE
HAVE PEOPLE ANNOYED YOU BY CRITICIZING YOUR DRINKING: NO
AVERAGE NUMBER OF DAYS PER WEEK YOU HAVE A DRINK CONTAINING ALCOHOL: 0
HAVE YOU EVER FELT YOU SHOULD CUT DOWN ON YOUR DRINKING: NO
HOW MANY TIMES IN THE PAST YEAR HAVE YOU HAD 5 OR MORE DRINKS IN A DAY: 0
TOTAL SCORE: 0
ALCOHOL_USE: NO

## 2019-09-27 ASSESSMENT — PAIN DESCRIPTION - DESCRIPTORS: DESCRIPTORS: DULL

## 2019-09-27 NOTE — ED TRIAGE NOTES
Patient complaints of centralized cp.  Some nausea with chills.  Positive dizziness.  Drinks and smokes.  Denies cough.  No major sob noted.

## 2019-09-27 NOTE — ED NOTES
Med Rec Updated and Complete per Pt at bedside  Allergies Reviewed  No PO ABX last 14 days.    Pt taking Naltrexone daily as a maintenance medication.

## 2019-09-27 NOTE — PROGRESS NOTES
Pt arrived to unit via home at 1545. Pt oriented to room, unit, and plan of care. Tele-monitor placed, SR 73; Pt c/o worsening cp with movement; MD notified. Medicated per MR; Admit profile and assessment complete All questions answered at this time. Call light within reach; fall precautions in place.

## 2019-09-27 NOTE — ASSESSMENT & PLAN NOTE
-Likely musculoskeletal, with reproducible chest tenderness.  However, reasonable to pursue further noninvasive cardiac work-up.  -I will obtain serial troponins, and an echocardiogram.  If troponins remain negative, would proceed with nuclear cardiac stress test in the morning.  -Start empiric aspirin for now until cardiac cause ruled out.  Check lipid profile and hemoglobin A1c for further risk stratification.  -Start as needed sublingual nitroglycerin, and morphine for pain recurrence.   -We will do further cardiac monitoring to rule out arrhythmias.  -For completion, I will obtain a d-dimer, and if elevated will proceed with ruling out PE.  Check urine drug screen.  -For possibility of GI related chest pain, I will start him on PRN GI cocktail, and Maalox.

## 2019-09-27 NOTE — H&P
Hospital Medicine History & Physical Note    Date of Service  9/27/2019    Primary Care Physician  ZAIRA Lowe.    Consultants  none    Code Status  Full    Chief Complaint  Chest pain    History of Presenting Illness  49 y.o. male with bipolar 1 disorder, and seizure disorder, who presented 9/27/2019 with chest pain.    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  No oral brash.  He denied any other complaint such as fevers, chills, nausea or vomiting, abdominal pain, or diarrhea.  He added that he had similar episode of this 2 weeks ago also while working at installing a furnace.  He got worried, prompting him to go to the ED.    ED course:  The patient was initially evaluated.  Vital signs were stable.  Initial blood work-up were unremarkable.  First troponin was negative.  Chest x-ray (personally reviewed) did not show any infiltrates or consolidation.  EKG (personally reviewed) showed no dynamic ischemic changes, with normal sinus rhythm.  Patient was subsequent admitted to the hospital service for further cardiac work-up.    Review of Systems  ROS     Pertinent positives/negatives as mentioned above.     A complete review of systems was personally done by me. All other systems were negative.       Past Medical History   has a past medical history of Bipolar 1 disorder (HCC), Bronchitis, Pain, Psychiatric disorder (6/5/2018), and Seizure disorder ([facility]), and H/O Bipolar.  (6/5/2018). He also has no past medical history of Anesthesia, Arrhythmia, Arthritis, Asthma, Breath shortness, Cancer (HCC), Carcinoma in situ of respiratory system,  part unspecified, Cholesterol blood decreased, Cold, Congestive heart failure (HCC), Coughing blood, Dental disorder, Encounter for renal dialysis, Glaucoma, Heart burn, Heart murmur, Heart valve disease, Hepatitis A, Hepatitis B, Hepatitis C, Hiatus hernia syndrome, Hypertension, Indigestion, Infectious disease, Jaundice, Myocardial infarct (HCC), Other and unspecified angina pectoris, Other emphysema (HCC), Other specified disorder of intestines, Other specified symptom associated with female genital organs, Pacemaker, Personal history of venous thrombosis and embolism, Pneumonia, Pregnant state, incidental, Renal disorder, Rheumatic fever, Sleep apnea, Snoring, Stroke (HCC), Tuberculosis, Type II or unspecified type diabetes mellitus without mention of complication, not stated as uncontrolled, Unspecified cataract, Unspecified disorder of thyroid, Unspecified hemorrhagic conditions, Unspecified urinary incontinence, or Urinary bladder disorder.    Surgical History   has a past surgical history that includes hand surgery; meniscus repair (12/10/2014); tonsillectomy and adenoidectomy; finger orif; wrist orif; submandible abscess incision and drainage (Right, 6/5/2018); and dental extraction(s) (6/5/2018).     Family History  Reviewed and not pertinent.       Social History   reports that he has quit smoking. His smoking use included cigarettes. He has a 20.00 pack-year smoking history. He has never used smokeless tobacco. He reports that he does not drink alcohol or use drugs.    Allergies  No Known Allergies    Medications  Prior to Admission Medications   Prescriptions Last Dose Informant Patient Reported? Taking?   gabapentin (NEURONTIN) 300 MG Cap 9/26/2019 at 2130 Patient Yes No   Sig: Take 300 mg by mouth 4 times a day.   lamoTRIgine (LAMICTAL) 25 MG Tab 9/26/2019 at 2130 Patient Yes Yes   Sig: Take 100 mg by mouth 2 Times a Day.   naltrexone (DEPADE) 50 MG Tab 9/26/2019 at 0630 Patient Yes No   Sig: Take 50  mg by mouth every morning.   quetiapine (SEROQUEL) 400 MG tablet 9/26/2019 at 2130 Patient Yes No   Sig: Take 400 mg by mouth every evening.   quetiapine (SEROQUEL) 50 MG tablet 9/26/2019 at 1600 Patient Yes No   Sig: Take 50 mg by mouth 2 times a day. 50 mg every morning  50 mg every afternoon   traZODone (DESYREL) 100 MG Tab 9/26/2019 at 2130 Patient Yes No   Sig: Take 100 mg by mouth every evening.   zolpidem (AMBIEN) 10 MG Tab 9/26/2019 at 2130 Patient Yes No   Sig: Take 10 mg by mouth at bedtime as needed for Sleep.      Facility-Administered Medications: None       Physical Exam  Temp:  [36.2 °C (97.2 °F)-36.5 °C (97.7 °F)] 36.5 °C (97.7 °F)  Pulse:  [58-71] 66  Resp:  [14-20] 14  BP: (128-134)/(81-90) 134/85  SpO2:  [93 %-100 %] 93 %    Physical Exam   Constitutional: He is oriented to person, place, and time. He appears well-developed and well-nourished. No distress.   HENT:   Head: Normocephalic and atraumatic.   Mouth/Throat: Oropharynx is clear and moist. No oropharyngeal exudate.   Eyes: Pupils are equal, round, and reactive to light. EOM are normal. Right eye exhibits no discharge. Left eye exhibits no discharge. No scleral icterus.   Neck: Normal range of motion. Neck supple.   Cardiovascular: Normal rate and regular rhythm. Exam reveals no gallop and no friction rub.   No murmur heard.  Pulmonary/Chest: Effort normal and breath sounds normal. He has no wheezes. He has no rales. He exhibits tenderness ( midsternal).   Abdominal: Soft. Bowel sounds are normal. There is no tenderness. There is no rebound and no guarding.   Musculoskeletal: Normal range of motion. He exhibits no edema or tenderness.   Lymphadenopathy:     He has no cervical adenopathy.   Neurological: He is alert and oriented to person, place, and time. No cranial nerve deficit.   Skin: Skin is warm and dry. No rash noted. He is not diaphoretic. No erythema.   Psychiatric: He has a normal mood and affect. His behavior is normal. Thought  content normal.   Vitals reviewed.      Laboratory:  Recent Labs     09/27/19  1237   WBC 6.9   RBC 5.66   HEMOGLOBIN 18.1*   HEMATOCRIT 54.3*   MCV 95.9   MCH 32.0   MCHC 33.3*   RDW 48.6   PLATELETCT 346   MPV 9.0     Recent Labs     09/27/19  1237   SODIUM 140   POTASSIUM 4.0   CHLORIDE 106   CO2 24   GLUCOSE 89   BUN 12   CREATININE 1.28   CALCIUM 9.9     Recent Labs     09/27/19  1237   ALTSGPT 12   ASTSGOT 13   ALKPHOSPHAT 61   TBILIRUBIN 0.6   GLUCOSE 89         No results for input(s): NTPROBNP in the last 72 hours.  Recent Labs     09/27/19  1237   TRIGLYCERIDE 86   HDL 61   *     Recent Labs     09/27/19  1237   TROPONINT 8       Urinalysis:    No results found     Imaging:  DX-CHEST-PORTABLE (1 VIEW)   Final Result      No pulmonary consolidation.      EC-ECHOCARDIOGRAM COMPLETE W/O CONT    (Results Pending)   NM-CARDIAC STRESS TEST    (Results Pending)         Assessment/Plan:  I anticipate this patient is appropriate for observation status at this time.    * Pain in the chest- (present on admission)  Assessment & Plan  -Likely musculoskeletal, with reproducible chest tenderness.  However, reasonable to pursue further noninvasive cardiac work-up.  -I will obtain serial troponins, and an echocardiogram.  If troponins remain negative, would proceed with nuclear cardiac stress test in the morning.  -Start empiric aspirin for now until cardiac cause ruled out.  Check lipid profile and hemoglobin A1c for further risk stratification.  -Start as needed sublingual nitroglycerin, and morphine for pain recurrence.   -We will do further cardiac monitoring to rule out arrhythmias.  -For completion, I will obtain a d-dimer, and if elevated will proceed with ruling out PE.  Check urine drug screen.  -For possibility of GI related chest pain, I will start him on PRN GI cocktail, and Maalox.      Bipolar disorder (HCC)- (present on admission)  Assessment & Plan  -I will resume his home psychotropics.    Seizure  disorder (HCC)- (present on admission)  Assessment & Plan  -Resume Lamictal.      VTE prophylaxis: SCD

## 2019-09-27 NOTE — ED PROVIDER NOTES
ED Provider Note    CHIEF COMPLAINT  Chief Complaint   Patient presents with   • Chest Pain       HPI  Andre Briceño is a 49 y.o. male who presents for evaluation of chest discomfort.  The patient reports around 3 to 4 hours ago he developed substernal chest pressure.  It does not radiate to the shoulder and the back.  He has a history of hypertension as well as long-standing tobacco use history.  He reports never having symptoms like this in the past.  No associated nausea vomiting epigastric pain.  No fever productive cough leg swelling or hemoptysis.  He has no known cardiopulmonary disease history.  He denies productive cough.  Denies cocaine or amphetamine use recently    REVIEW OF SYSTEMS  See HPI for further details.  No high fevers chills night sweats weight loss all other systems are negative.     PAST MEDICAL HISTORY  Past Medical History:   Diagnosis Date   • Seizure disorder (HCC), and H/O Bipolar.  6/5/2018   • Psychiatric disorder 6/5/2018   • Bipolar 1 disorder (HCC)    • Bronchitis    • Pain        FAMILY HISTORY  Family history of heart disease    SOCIAL HISTORY  Social History     Socioeconomic History   • Marital status:      Spouse name: Not on file   • Number of children: Not on file   • Years of education: Not on file   • Highest education level: Not on file   Occupational History   • Not on file   Social Needs   • Financial resource strain: Not on file   • Food insecurity:     Worry: Not on file     Inability: Not on file   • Transportation needs:     Medical: Not on file     Non-medical: Not on file   Tobacco Use   • Smoking status: Former Smoker     Packs/day: 1.00     Years: 20.00     Pack years: 20.00     Types: Cigarettes   • Smokeless tobacco: Never Used   • Tobacco comment: Quit 5/2018   Substance and Sexual Activity   • Alcohol use: No     Comment: quit 5/2018, prev.heavy use (3 pints vodka per day)   • Drug use: No     Comment: previous marijuana, quit 5/2018   • Sexual  activity: Not on file   Lifestyle   • Physical activity:     Days per week: Not on file     Minutes per session: Not on file   • Stress: Not on file   Relationships   • Social connections:     Talks on phone: Not on file     Gets together: Not on file     Attends Taoism service: Not on file     Active member of club or organization: Not on file     Attends meetings of clubs or organizations: Not on file     Relationship status: Not on file   • Intimate partner violence:     Fear of current or ex partner: Not on file     Emotionally abused: Not on file     Physically abused: Not on file     Forced sexual activity: Not on file   Other Topics Concern   • Not on file   Social History Narrative   • Not on file   History of smoking    SURGICAL HISTORY  Past Surgical History:   Procedure Laterality Date   • SUBMANDIBLE ABSCESS INCISION AND DRAINAGE Right 6/5/2018    Procedure: SUBMANDIBLE ABSCESS INCISION AND DRAINAGE;  Surgeon: Thierry Marinelli DMD, M.D.;  Location: [facility];  Service: Oral Surgery   • DENTAL EXTRACTION(S)  6/5/2018    Procedure: DENTAL EXTRACTION(S) TOOTH 21 & 28;  Surgeon: Thierry Marinelli DMD, M.D.;  Location: [facility];  Service: Oral Surgery   • MENISCUS REPAIR  12/10/2014    Performed by Jose Araya M.D. at SURGERY [facility]   • FINGER ORIF     • HAND SURGERY      left index finger, right wrist   • TONSILLECTOMY AND ADENOIDECTOMY     • WRIST ORIF         CURRENT MEDICATIONS  Home Medications     Reviewed by An Bland R.N. (Registered Nurse) on 09/27/19 at 1151  Med List Status: Complete   Medication Last Dose Status   cyclobenzaprine (FLEXERIL) 10 MG Tab  Active   gabapentin (NEURONTIN) 300 MG Cap  Active   lamoTRIgine (LAMICTAL) 25 MG Tab  Active   naltrexone (DEPADE) 50 MG Tab  Active   quetiapine (SEROQUEL) 400 MG tablet  Active   quetiapine (SEROQUEL) 50 MG tablet  Active   traZODone (DESYREL) 100 MG Tab  Active   zolpidem (AMBIEN) 5 MG  "Tab  Active                ALLERGIES  No Known Allergies    PHYSICAL EXAM  VITAL SIGNS: /81   Pulse (!) 58   Temp 36.2 °C (97.2 °F) (Temporal)   Resp 20   Ht 1.905 m (6' 3\")   Wt 74.4 kg (164 lb 0.4 oz)   SpO2 100%   BMI 20.50 kg/m²  Room air O2: 99    Constitutional: Well developed, Well nourished, No acute distress, Non-toxic appearance.   HENT: Normocephalic, Atraumatic, Bilateral external ears normal, Oropharynx moist, No oral exudates, Nose normal.   Eyes: PERRLA, EOMI, Conjunctiva normal, No discharge.   Neck: Normal range of motion, No tenderness, Supple, No stridor.   Cardiovascular: Normal heart rate, Normal rhythm, No murmurs, No rubs, No gallops.   Thorax & Lungs: Normal breath sounds, No respiratory distress, No wheezing, No chest tenderness.   Abdomen: Bowel sounds normal, Soft, No tenderness, No masses, No pulsatile masses.   Skin: Warm, Dry, No erythema, No rash.   Back: No tenderness, No CVA tenderness.   Extremities: Intact distal pulses, No edema, No tenderness, No cyanosis, No clubbing.   Neurologic: Alert & oriented x 3, Normal motor function, Normal sensory function, No focal deficits noted.   Psychiatric: Anxious       Results for orders placed or performed during the hospital encounter of 09/27/19   CBC with Differential   Result Value Ref Range    WBC 6.9 4.8 - 10.8 K/uL    RBC 5.66 4.70 - 6.10 M/uL    Hemoglobin 18.1 (H) 14.0 - 18.0 g/dL    Hematocrit 54.3 (H) 42.0 - 52.0 %    MCV 95.9 81.4 - 97.8 fL    MCH 32.0 27.0 - 33.0 pg    MCHC 33.3 (L) 33.7 - 35.3 g/dL    RDW 48.6 35.9 - 50.0 fL    Platelet Count 346 164 - 446 K/uL    MPV 9.0 9.0 - 12.9 fL    Neutrophils-Polys 54.60 44.00 - 72.00 %    Lymphocytes 31.50 22.00 - 41.00 %    Monocytes 12.30 0.00 - 13.40 %    Eosinophils 0.60 0.00 - 6.90 %    Basophils 0.70 0.00 - 1.80 %    Immature Granulocytes 0.30 0.00 - 0.90 %    Nucleated RBC 0.00 /100 WBC    Neutrophils (Absolute) 3.77 1.82 - 7.42 K/uL    Lymphs (Absolute) 2.18 1.00 - " 4.80 K/uL    Monos (Absolute) 0.85 0.00 - 0.85 K/uL    Eos (Absolute) 0.04 0.00 - 0.51 K/uL    Baso (Absolute) 0.05 0.00 - 0.12 K/uL    Immature Granulocytes (abs) 0.02 0.00 - 0.11 K/uL    NRBC (Absolute) 0.00 K/uL   Complete Metabolic Panel (CMP)   Result Value Ref Range    Sodium 140 135 - 145 mmol/L    Potassium 4.0 3.6 - 5.5 mmol/L    Chloride 106 96 - 112 mmol/L    Co2 24 20 - 33 mmol/L    Anion Gap 10.0 0.0 - 11.9    Glucose 89 65 - 99 mg/dL    Bun 12 8 - 22 mg/dL    Creatinine 1.28 0.50 - 1.40 mg/dL    Calcium 9.9 8.5 - 10.5 mg/dL    AST(SGOT) 13 12 - 45 U/L    ALT(SGPT) 12 2 - 50 U/L    Alkaline Phosphatase 61 30 - 99 U/L    Total Bilirubin 0.6 0.1 - 1.5 mg/dL    Albumin 5.1 (H) 3.2 - 4.9 g/dL    Total Protein 7.4 6.0 - 8.2 g/dL    Globulin 2.3 1.9 - 3.5 g/dL    A-G Ratio 2.2 g/dL   Troponin   Result Value Ref Range    Troponin T 8 6 - 19 ng/L   ESTIMATED GFR   Result Value Ref Range    GFR If African American >60 >60 mL/min/1.73 m 2    GFR If Non African American 60 >60 mL/min/1.73 m 2   EKG (NOW)   Result Value Ref Range    Report       [facility] Emergency Dept.    Test Date:  2019  Pt Name:    DAISY SPEARS                 Department: ER  MRN:        [id number]                      Room:  Gender:     Male                         Technician: [id number]  :        1970                   Requested By:ER TRIAGE PROTOCOL  Order #:    [id number]                    Mert MD: EVA HOLBROOK MD    Measurements  Intervals                                Axis  Rate:       64                           P:          0  VT:         119                          QRS:        84  QRSD:       114                          T:          59  QT:         472  QTc:        487    Interpretive Statements  SINUS RHYTHM  BORDERLINE SHORT VT INTERVAL  NONSPECIFIC INTRAVENTRICULAR CONDUCTION DELAY  PROBABLE INFERIOR INFARCT, AGE INDETERMINATE  PROBABLE LATERAL INFARCT, OLD  CONSIDER ANTERIOR INFARCT  Compared to  ECG 03/15/2018 13:36:06  Intraventricular conduction delay now present  Myocardial infarct finding now present     Electronically Signed On 9- 12:40:55 PDT by STEPHEN TUCKER MD        EKG interpretation by me rate 64 sinus rhythm.  Nonspecific ST changes in the precordial leads no significant ST segment elevation greater than 1 mm to suggest acute coronary syndrome no pathological T wave inversions no ectopy  RADIOLOGY/PROCEDURES  DX-CHEST-PORTABLE (1 VIEW)   Final Result      No pulmonary consolidation.            COURSE & MEDICAL DECISION MAKING  Pertinent Labs & Imaging studies reviewed. (See chart for details)  Patient presents here with diaphoresis and chest discomfort going on over the last several hours.  He has risk factors including male gender, extensive smoking history and additional family history reveals that he had several first-degree uncles with heart attacks in their 40s.  He has never been worked up for this before.  His initial troponin is negative.  Chest x-ray is negative as well.  No suggestion of pulmonary embolism as his PERC score is 0.  He has enough risk factors that I feel that admission to the clinical decision unit is indicated for rule out of ACS and risk stratification    FINAL IMPRESSION  1.  Chest pain    Admission rule out ACS         Electronically signed by: Stephen Tucker, 9/27/2019 12:40 PM

## 2019-09-27 NOTE — ED NOTES
Wheeled to rm 6, changed into a gown, placed on the monitor. X-ray at the bedside, chart up for ERP.

## 2019-09-28 ENCOUNTER — PATIENT OUTREACH (OUTPATIENT)
Dept: HEALTH INFORMATION MANAGEMENT | Facility: OTHER | Age: 49
End: 2019-09-28

## 2019-09-28 ENCOUNTER — APPOINTMENT (OUTPATIENT)
Dept: RADIOLOGY | Facility: MEDICAL CENTER | Age: 49
End: 2019-09-28
Attending: INTERNAL MEDICINE
Payer: MEDICAID

## 2019-09-28 VITALS
HEART RATE: 61 BPM | SYSTOLIC BLOOD PRESSURE: 108 MMHG | BODY MASS INDEX: 20.37 KG/M2 | TEMPERATURE: 97.4 F | DIASTOLIC BLOOD PRESSURE: 64 MMHG | WEIGHT: 163.8 LBS | HEIGHT: 75 IN | RESPIRATION RATE: 14 BRPM | OXYGEN SATURATION: 95 %

## 2019-09-28 PROBLEM — R07.9 PAIN IN THE CHEST: Status: RESOLVED | Noted: 2019-09-27 | Resolved: 2019-09-28

## 2019-09-28 LAB
ANION GAP SERPL CALC-SCNC: 8 MMOL/L (ref 0–11.9)
BASOPHILS # BLD AUTO: 0.7 % (ref 0–1.8)
BASOPHILS # BLD: 0.04 K/UL (ref 0–0.12)
BUN SERPL-MCNC: 13 MG/DL (ref 8–22)
CALCIUM SERPL-MCNC: 8.9 MG/DL (ref 8.5–10.5)
CHLORIDE SERPL-SCNC: 108 MMOL/L (ref 96–112)
CO2 SERPL-SCNC: 23 MMOL/L (ref 20–33)
CREAT SERPL-MCNC: 1.2 MG/DL (ref 0.5–1.4)
EOSINOPHIL # BLD AUTO: 0.16 K/UL (ref 0–0.51)
EOSINOPHIL NFR BLD: 2.8 % (ref 0–6.9)
ERYTHROCYTE [DISTWIDTH] IN BLOOD BY AUTOMATED COUNT: 50.3 FL (ref 35.9–50)
GLUCOSE SERPL-MCNC: 98 MG/DL (ref 65–99)
HCT VFR BLD AUTO: 48.5 % (ref 42–52)
HGB BLD-MCNC: 16 G/DL (ref 14–18)
IMM GRANULOCYTES # BLD AUTO: 0.02 K/UL (ref 0–0.11)
IMM GRANULOCYTES NFR BLD AUTO: 0.4 % (ref 0–0.9)
LYMPHOCYTES # BLD AUTO: 2.13 K/UL (ref 1–4.8)
LYMPHOCYTES NFR BLD: 37.8 % (ref 22–41)
MCH RBC QN AUTO: 32.1 PG (ref 27–33)
MCHC RBC AUTO-ENTMCNC: 33 G/DL (ref 33.7–35.3)
MCV RBC AUTO: 97.4 FL (ref 81.4–97.8)
MONOCYTES # BLD AUTO: 0.73 K/UL (ref 0–0.85)
MONOCYTES NFR BLD AUTO: 13 % (ref 0–13.4)
NEUTROPHILS # BLD AUTO: 2.55 K/UL (ref 1.82–7.42)
NEUTROPHILS NFR BLD: 45.3 % (ref 44–72)
NRBC # BLD AUTO: 0 K/UL
NRBC BLD-RTO: 0 /100 WBC
PLATELET # BLD AUTO: 285 K/UL (ref 164–446)
PMV BLD AUTO: 9 FL (ref 9–12.9)
POTASSIUM SERPL-SCNC: 4 MMOL/L (ref 3.6–5.5)
RBC # BLD AUTO: 4.98 M/UL (ref 4.7–6.1)
SODIUM SERPL-SCNC: 139 MMOL/L (ref 135–145)
WBC # BLD AUTO: 5.6 K/UL (ref 4.8–10.8)

## 2019-09-28 PROCEDURE — 700102 HCHG RX REV CODE 250 W/ 637 OVERRIDE(OP): Performed by: INTERNAL MEDICINE

## 2019-09-28 PROCEDURE — A9502 TC99M TETROFOSMIN: HCPCS

## 2019-09-28 PROCEDURE — 700111 HCHG RX REV CODE 636 W/ 250 OVERRIDE (IP)

## 2019-09-28 PROCEDURE — G0378 HOSPITAL OBSERVATION PER HR: HCPCS

## 2019-09-28 PROCEDURE — 700111 HCHG RX REV CODE 636 W/ 250 OVERRIDE (IP): Performed by: INTERNAL MEDICINE

## 2019-09-28 PROCEDURE — A9270 NON-COVERED ITEM OR SERVICE: HCPCS | Performed by: INTERNAL MEDICINE

## 2019-09-28 PROCEDURE — 99217 PR OBSERVATION CARE DISCHARGE: CPT | Performed by: INTERNAL MEDICINE

## 2019-09-28 PROCEDURE — 80048 BASIC METABOLIC PNL TOTAL CA: CPT

## 2019-09-28 PROCEDURE — 85025 COMPLETE CBC W/AUTO DIFF WBC: CPT

## 2019-09-28 PROCEDURE — 96376 TX/PRO/DX INJ SAME DRUG ADON: CPT

## 2019-09-28 RX ORDER — CYCLOBENZAPRINE HCL 10 MG
10 TABLET ORAL 3 TIMES DAILY PRN
Qty: 15 TAB | Refills: 0 | Status: SHIPPED | OUTPATIENT
Start: 2019-09-28 | End: 2019-10-03

## 2019-09-28 RX ORDER — REGADENOSON 0.08 MG/ML
INJECTION, SOLUTION INTRAVENOUS
Status: COMPLETED
Start: 2019-09-28 | End: 2019-09-28

## 2019-09-28 RX ADMIN — REGADENOSON 0.4 MG: 0.08 INJECTION, SOLUTION INTRAVENOUS at 08:30

## 2019-09-28 RX ADMIN — ASPIRIN 325 MG: 325 TABLET, FILM COATED ORAL at 05:35

## 2019-09-28 RX ADMIN — GABAPENTIN 300 MG: 300 CAPSULE ORAL at 11:03

## 2019-09-28 RX ADMIN — LAMOTRIGINE 100 MG: 100 TABLET ORAL at 05:35

## 2019-09-28 RX ADMIN — QUETIAPINE FUMARATE 50 MG: 25 TABLET ORAL at 05:36

## 2019-09-28 RX ADMIN — NALTREXONE HYDROCHLORIDE 50 MG: 50 TABLET, FILM COATED ORAL at 05:36

## 2019-09-28 RX ADMIN — KETOROLAC TROMETHAMINE 30 MG: 30 INJECTION, SOLUTION INTRAMUSCULAR at 11:02

## 2019-09-28 NOTE — DISCHARGE SUMMARY
Discharge Summary    CHIEF COMPLAINT ON ADMISSION  Chief Complaint   Patient presents with   • Chest Pain       Reason for Admission  Chest Pain     Admission Date  9/27/2019    CODE STATUS  Full Code    HPI & HOSPITAL COURSE  This is a 49 y.o. male here with chest pain.  Patient has known medical history of bipolar 1 disorder, and seizure disorder who presented 9/27/2018 with chest pain.  Patient states he was working on installing a furnace and was helping lift the furnace when he started to notice chest pain in the middle of his chest radiating to his back.  Patient rated it as 5/10 in severity and worse when he stood up associated with diaphoresis.  This caused him some concern and he presented to the emergency room for further evaluation.  Patient was admitted to CDU for further work-up and monitoring.  Patient was placed on telemetry monitoring with no acute cardiac events noted.  EKG showed sinus rhythm with no acute ST changes or T wave abnormalities.  Chest x-ray shows no acute cardiopulmonary process.  Vital signs are stable.  CBC and CMP were essentially benign and noncontributory.  Troponin remained negative.  Urine drug screen was completed which is positive for cannabinoid.  Patient underwent echocardiogram which showed an EF of 65% otherwise normal.  Patient underwent nuclear medicine cardiac stress test which showed no evidence of significant jeopardized viable myocardium or prior myocardium function.  Discussed findings with patient who states his chest pain has resolved at this time with help of muscle relaxer and NSAID use.  Explained to patient this is likely musculoskeletal from heavy lifting and he should avoid heavy lifting for several days to allow healing.  Patient's vital signs remained stable.  Patient is up ambulating independently.  Patient's lungs are clear bilaterally on auscultation.  Patient is maintaining oxygen saturations on room air.  Pain is reproducible upon palpation of chest  marium.  Patient will need to follow-up with his PCP.  Patient given short course of muscle relaxers and encouraged to use ibuprofen as needed to help control pain.       Therefore, he is discharged in good and stable condition to home with close outpatient follow-up.        Discharge Date  9/28/2019    FOLLOW UP ITEMS POST DISCHARGE  Please follow up with PCP   Try to not lift anything heavy for a few days     DISCHARGE DIAGNOSES  Principal Problem:    Pain in the chest POA: Yes  Active Problems:    Seizure disorder (HCC) POA: Yes    Bipolar disorder (HCC) POA: Yes  Resolved Problems:    * No resolved hospital problems. *      FOLLOW UP  Future Appointments   Date Time Provider Department Center   10/22/2019  1:00 PM FRANCISCO Bermudez [facility] None     FRANCISCO Lowe  [address] [facility] [id number] [phone]      PCP closed for weekend. Please call them directly to schedule a follow up appointment as soon as possible. Thank you.      MEDICATIONS ON DISCHARGE     Medication List      START taking these medications      Instructions   cyclobenzaprine 10 MG Tabs  Commonly known as:  FLEXERIL   Take 1 Tab by mouth 3 times a day as needed for Muscle Spasms for up to 5 days.  Dose:  10 mg        CONTINUE taking these medications      Instructions   gabapentin 300 MG Caps  Commonly known as:  NEURONTIN   Take 300 mg by mouth 4 times a day.  Dose:  300 mg     lamoTRIgine 25 MG Tabs  Commonly known as:  LAMICTAL   Take 100 mg by mouth 2 Times a Day.  Dose:  100 mg     naltrexone 50 MG Tabs  Commonly known as:  DEPADE   Take 50 mg by mouth every morning.  Dose:  50 mg     * SEROQUEL 50 MG tablet  Generic drug:  quetiapine   Take 50 mg by mouth 2 times a day. 50 mg every morning  50 mg every afternoon  Dose:  50 mg     * SEROQUEL 400 MG tablet  Generic drug:  quetiapine   Take 400 mg by mouth every evening.  Dose:  400 mg     traZODone 100 MG Tabs  Commonly known as:  DESYREL   Take 100 mg by mouth  every evening.  Dose:  100 mg     zolpidem 10 MG Tabs  Commonly known as:  AMBIEN   Take 10 mg by mouth at bedtime as needed for Sleep.  Dose:  10 mg         * This list has 2 medication(s) that are the same as other medications prescribed for you. Read the directions carefully, and ask your doctor or other care provider to review them with you.                Allergies  No Known Allergies    DIET  Orders Placed This Encounter   Procedures   • Diet Order Cardiac     Standing Status:   Standing     Number of Occurrences:   1     Order Specific Question:   Diet:     Answer:   Cardiac [6]     Order Specific Question:   Texture/Fiber modifications:     Answer:   Dysphagia 3(Mechanical Soft)specify fluid consistency(question 6) [3]     Order Specific Question:   Miscellaneous modifications:     Answer:   No Decaf, No Caffeine(for test) [11]       ACTIVITY  As tolerated.  Weight bearing as tolerated    CONSULTATIONS  None     PROCEDURES  NM Cardiac Stress Test     LABORATORY  Lab Results   Component Value Date    SODIUM 139 09/28/2019    POTASSIUM 4.0 09/28/2019    CHLORIDE 108 09/28/2019    CO2 23 09/28/2019    GLUCOSE 98 09/28/2019    BUN 13 09/28/2019    CREATININE 1.20 09/28/2019        Lab Results   Component Value Date    WBC 5.6 09/28/2019    HEMOGLOBIN 16.0 09/28/2019    HEMATOCRIT 48.5 09/28/2019    PLATELETCT 285 09/28/2019

## 2019-09-28 NOTE — DISCHARGE INSTRUCTIONS
Discharge Instructions    Discharged to home by car with relative. Discharged via wheelchair, hospital escort: Yes.  Special equipment needed: Not Applicable    Be sure to schedule a follow-up appointment with your primary care doctor or any specialists as instructed.     Discharge Plan:   Diet Plan: Discussed  Activity Level: Discussed  Confirmed Follow up Appointment: Patient to Call and Schedule Appointment  Confirmed Symptoms Management: Discussed  Medication Reconciliation Updated: Yes  Influenza Vaccine Indication: Patient Refuses    I understand that a diet low in cholesterol, fat, and sodium is recommended for good health. Unless I have been given specific instructions below for another diet, I accept this instruction as my diet prescription.   Other diet: Heart Healthy    Special Instructions: None    · Is patient discharged on Warfarin / Coumadin?   No     Depression / Suicide Risk    As you are discharged from this [facility] facility, it is important to learn how to keep safe from harming yourself.    Recognize the warning signs:  · Abrupt changes in personality, positive or negative- including increase in energy   · Giving away possessions  · Change in eating patterns- significant weight changes-  positive or negative  · Change in sleeping patterns- unable to sleep or sleeping all the time   · Unwillingness or inability to communicate  · Depression  · Unusual sadness, discouragement and loneliness  · Talk of wanting to die  · Neglect of personal appearance   · Rebelliousness- reckless behavior  · Withdrawal from people/activities they love  · Confusion- inability to concentrate     If you or a loved one observes any of these behaviors or has concerns about self-harm, here's what you can do:  · Talk about it- your feelings and reasons for harming yourself  · Remove any means that you might use to hurt yourself (examples: pills, rope, extension cords, firearm)  · Get professional help from the  community (Mental Health, Substance Abuse, psychological counseling)  · Do not be alone:Call your Safe Contact- someone whom you trust who will be there for you.  · Call your local CRISIS HOTLINE 812-3202 or 050-059-3090  · Call your local Children's Mobile Crisis Response Team [place] (642) 446-6940 or www.YEOXIN VMall  · Call the toll free National Suicide Prevention Hotlines   · National Suicide Prevention Lifeline 864-950-CHNB (9550)  · National Hope Line Network 800-SUICIDE (003-0366)        Chest Pain Observation  It is often hard to give a specific diagnosis for the cause of chest pain. Among other possibilities your symptoms might be caused by inadequate oxygen delivery to your heart (angina). Angina that is not treated or evaluated can lead to a heart attack (myocardial infarction) or death.  Blood tests, electrocardiograms, and X-rays may have been done to help determine a possible cause of your chest pain. After evaluation and observation, your health care provider has determined that it is unlikely your pain was caused by an unstable condition that requires hospitalization. However, a full evaluation of your pain may need to be completed, with additional diagnostic testing as directed. It is very important to keep your follow-up appointments. Not keeping your follow-up appointments could result in permanent heart damage, disability, or death. If there is any problem keeping your follow-up appointments, you must call your health care provider.  HOME CARE INSTRUCTIONS   Due to the slight chance that your pain could be angina, it is important to follow your health care provider's treatment plan and also maintain a healthy lifestyle:  · Maintain or work toward achieving a healthy weight.  · Stay physically active and exercise regularly.  · Decrease your salt intake.  · Eat a balanced, healthy diet. Talk to a dietitian to learn about heart-healthy foods.  · Increase your fiber intake by including  whole grains, vegetables, fruits, and nuts in your diet.  · Avoid situations that cause stress, anger, or depression.  · Take medicines as advised by your health care provider. Report any side effects to your health care provider. Do not stop medicines or adjust the dosages on your own.  · Quit smoking. Do not use nicotine patches or gum until you check with your health care provider.  · Keep your blood pressure, blood sugar, and cholesterol levels within normal limits.  · Limit alcohol intake to no more than 1 drink per day for women who are not pregnant and 2 drinks per day for men.  · Do not abuse drugs.  SEEK IMMEDIATE MEDICAL CARE IF:  You have severe chest pain or pressure which may include symptoms such as:  · You feel pain or pressure in your arms, neck, jaw, or back.  · You have severe back or abdominal pain, feel sick to your stomach (nauseous), or throw up (vomit).  · You are sweating profusely.  · You are having a fast or irregular heartbeat.  · You feel short of breath while at rest.  · You notice increasing shortness of breath during rest, sleep, or with activity.  · You have chest pain that does not get better after rest or after taking your usual medicine.  · You wake from sleep with chest pain.  · You are unable to sleep because you cannot breathe.  · You develop a frequent cough or you are coughing up blood.  · You feel dizzy, faint, or experience extreme fatigue.  · You develop severe weakness, dizziness, fainting, or chills.  Any of these symptoms may represent a serious problem that is an emergency. Do not wait to see if the symptoms will go away. Call your local emergency services (911 in the [place] Do not drive yourself to the hospital.  MAKE SURE YOU:  · Understand these instructions.  · Will watch your condition.  · Will get help right away if you are not doing well or get worse.     This information is not intended to replace advice given to you by your health care provider. Make sure you  discuss any questions you have with your health care provider.     Document Released: 01/20/2012 Document Revised: 12/23/2014 Document Reviewed: 06/19/2014  Elsevier Interactive Patient Education ©2016 [employer]

## 2019-09-28 NOTE — CARE PLAN
Problem: Communication  Goal: The ability to communicate needs accurately and effectively will improve  Outcome: PROGRESSING AS EXPECTED     Problem: Safety  Goal: Will remain free from injury  Outcome: PROGRESSING AS EXPECTED  Goal: Will remain free from falls  Outcome: PROGRESSING AS EXPECTED     Problem: Pain Management  Goal: Pain level will decrease to patient's comfort goal  Outcome: PROGRESSING AS EXPECTED     Problem: Mobility  Goal: Risk for activity intolerance will decrease  Outcome: PROGRESSING AS EXPECTED     Problem: Medication  Goal: Compliance with prescribed medication will improve  Outcome: PROGRESSING AS EXPECTED     Problem: Knowledge Deficit  Goal: Knowledge of disease process/condition, treatment plan, diagnostic tests, and medications will improve  Outcome: PROGRESSING AS EXPECTED  Goal: Knowledge of the prescribed therapeutic regimen will improve  Outcome: PROGRESSING AS EXPECTED

## 2019-09-28 NOTE — PROGRESS NOTES
Bedside report received 0742. POC discussed with pt; Pt denies CP; Pending stress; No overnight cardiac events; all questions answered at this time.

## 2019-09-28 NOTE — PROGRESS NOTES
Pt dc'd home. IV and monitor removed; Pt left unit via walking with mother; Refused hospital escort. Personal belongings with pt when leaving unit. Pt given discharge instructions prior to leaving unit including prescription and when to visit with physician; verbalizes understanding. Copy of discharge instructions with pt and in the chart.

## 2019-10-22 ENCOUNTER — OFFICE VISIT (OUTPATIENT)
Dept: NEUROLOGY | Facility: MEDICAL CENTER | Age: 49
End: 2019-10-22
Payer: COMMERCIAL

## 2019-10-22 ENCOUNTER — HOSPITAL ENCOUNTER (OUTPATIENT)
Dept: LAB | Facility: MEDICAL CENTER | Age: 49
End: 2019-10-22
Attending: NURSE PRACTITIONER
Payer: MEDICAID

## 2019-10-22 VITALS
TEMPERATURE: 96.8 F | DIASTOLIC BLOOD PRESSURE: 76 MMHG | HEART RATE: 78 BPM | WEIGHT: 170 LBS | BODY MASS INDEX: 21.14 KG/M2 | OXYGEN SATURATION: 98 % | RESPIRATION RATE: 18 BRPM | SYSTOLIC BLOOD PRESSURE: 126 MMHG | HEIGHT: 75 IN

## 2019-10-22 DIAGNOSIS — Z13.31 SCREENING FOR DEPRESSION: ICD-10-CM

## 2019-10-22 DIAGNOSIS — F39 MOOD DISORDER (HCC): ICD-10-CM

## 2019-10-22 DIAGNOSIS — E55.9 VITAMIN D DEFICIENCY: ICD-10-CM

## 2019-10-22 DIAGNOSIS — G40.109 LOCALIZATION-RELATED EPILEPSY (HCC): ICD-10-CM

## 2019-10-22 PROCEDURE — 82306 VITAMIN D 25 HYDROXY: CPT

## 2019-10-22 PROCEDURE — 36415 COLL VENOUS BLD VENIPUNCTURE: CPT

## 2019-10-22 PROCEDURE — 99214 OFFICE O/P EST MOD 30 MIN: CPT | Performed by: NURSE PRACTITIONER

## 2019-10-22 NOTE — PROGRESS NOTES
Chief Complaint   Patient presents with   • Follow-Up     Localization-related epilepsy (HCC)       Problem List Items Addressed This Visit     None      Visit Diagnoses     Localization-related epilepsy (HCC)        Screening for depression        Mood disorder (HCC)              Interim History:  Andre Briceño 49 y.o. male presents today for seizure f/u.    Pt reports having no spells. Last spell was in Nov 2018. He is now taking lamotrigine 100mg QHS. He states that he probably misread the instructions so he was only taking 4 tabs at night. No side effects. No rash.     No more tremors reported after being off the depakote.     Hasn't done lab work for vit D.    Not taking vit D.     Driving.     Mood is stable. No SI or HI        Past medical history:   Past Medical History:   Diagnosis Date   • Bipolar 1 disorder (HCC)    • Bronchitis    • Pain    • Psychiatric disorder 6/5/2018   • Seizure disorder (HCC), and H/O Bipolar.  6/5/2018       Past surgical history:   Past Surgical History:   Procedure Laterality Date   • SUBMANDIBLE ABSCESS INCISION AND DRAINAGE Right 6/5/2018    Procedure: SUBMANDIBLE ABSCESS INCISION AND DRAINAGE;  Surgeon: Thierry Marinelli DMD, M.D.;  Location: [facility];  Service: Oral Surgery   • DENTAL EXTRACTION(S)  6/5/2018    Procedure: DENTAL EXTRACTION(S) TOOTH 21 & 28;  Surgeon: Thierry Marinelli DMD, M.D.;  Location: [facility];  Service: Oral Surgery   • MENISCUS REPAIR  12/10/2014    Performed by Jose Araya M.D. at SURGERY [facility]   • FINGER ORIF     • HAND SURGERY      left index finger, right wrist   • TONSILLECTOMY AND ADENOIDECTOMY     • WRIST ORIF         Family history:   History reviewed. No pertinent family history.    Social history:   Social History     Socioeconomic History   • Marital status:      Spouse name: Not on file   • Number of children: Not on file   • Years of education: Not on file   • Highest  education level: Not on file   Occupational History   • Not on file   Social Needs   • Financial resource strain: Not on file   • Food insecurity:     Worry: Not on file     Inability: Not on file   • Transportation needs:     Medical: Not on file     Non-medical: Not on file   Tobacco Use   • Smoking status: Former Smoker     Packs/day: 1.00     Years: 20.00     Pack years: 20.00     Types: Cigarettes   • Smokeless tobacco: Never Used   • Tobacco comment: Quit 5/2018   Substance and Sexual Activity   • Alcohol use: No     Comment: quit 5/2018, prev.heavy use (3 pints vodka per day)   • Drug use: No     Comment: previous marijuana, quit 5/2018   • Sexual activity: Not on file   Lifestyle   • Physical activity:     Days per week: Not on file     Minutes per session: Not on file   • Stress: Not on file   Relationships   • Social connections:     Talks on phone: Not on file     Gets together: Not on file     Attends Restoration service: Not on file     Active member of club or organization: Not on file     Attends meetings of clubs or organizations: Not on file     Relationship status: Not on file   • Intimate partner violence:     Fear of current or ex partner: Not on file     Emotionally abused: Not on file     Physically abused: Not on file     Forced sexual activity: Not on file   Other Topics Concern   • Not on file   Social History Narrative   • Not on file       Current medications:   Current Outpatient Medications   Medication   • lamoTRIgine (LAMICTAL) 25 MG Tab   • gabapentin (NEURONTIN) 300 MG Cap   • traZODone (DESYREL) 100 MG Tab   • zolpidem (AMBIEN) 10 MG Tab   • naltrexone (DEPADE) 50 MG Tab   • quetiapine (SEROQUEL) 50 MG tablet   • quetiapine (SEROQUEL) 400 MG tablet     No current facility-administered medications for this visit.        Medication Allergy:  No Known Allergies      Review of systems:     General: Denies fevers or chills, or nightsweats, or generalized fatigue.    Head: Denies headaches  "or dizziness or lightheadedness  EENT: Denies vision changes, vision loss or pain, nasal secretion, nasal bleeding, difficulty swallowing, hearing loss, tinnitus, vertigo, ear pain  Respiratory: Denies shortness of breath, cough, sputum, or wheezing  Cardiac: Denies chest pain, palpitations, edema or syncope  Gastrointestinal: Denies nausea, vomiting, no abdominal pain or change in bowel habits, no melena or hematochezia  Urinary: Denies dysuria, frequency, hesitancy, or incontinence.  Dermatologic:  Denies new rash  Musculoskeletal: Denies muscle pain or swelling, no atrophy, no neck and back pain or stiffness.   Neurologic: Denies facial droopiness, muscle weakness (focal or generalized), paresthesias, ataxia, change in speech or language, memory loss, abnormal movements, seizures, loss of consciousness, or episodes of confusion.   Psychiatric: Denies anxiety, depression,suicidal or homicidal thoughts       Physical examination:   Vitals:    10/22/19 1300   BP: 126/76   BP Location: Right arm   Patient Position: Sitting   BP Cuff Size: Adult   Pulse: 78   Resp: 18   Temp: 36 °C (96.8 °F)   TempSrc: Temporal   SpO2: 98%   Weight: 77.1 kg (170 lb)   Height: 1.905 m (6' 3\")     General: Patient in no acute distress, pleasant and cooperative.  HEENT: Normocephalic, no signs of acute trauma.   Neck: Supple. There is normal range of motion.   Resp: clear to auscultation bilaterally. No wheezes or crackles.   CV: RRR, no murmurs.   Skin: no signs of acute rashes or trauma.   Musculoskeletal: joints exhibit full range of motion, without any pain to palpation. There are no signs of joint or muscle swelling. There is no tenderness to deep palpation of muscles.   Psychiatric: No hallucinatory behavior. No symptoms of depression or suicidal ideation. Mood and affect appear normal on exam.      NEUROLOGICAL EXAM:   Mental status, orientation: Awake, alert and fully oriented.   Speech and language: speech is clear and fluent. " The patient is able to name, repeat and comprehend.   Memory: There is intact recollection of recent and remote events.   Cranial nerve exam:   CN I: Not examined   CN II: PERRL.   CN III, IV, VI: EOMI; no nystagmus   CN V: Facial sensation intact bilaterally   CN VII: face symmetric   CN VIII: hearing intact to finger rub bilaterally   CN IX, X: palate elevates symmetrically   CN XI: Symmetric shoulder shrug  CN XII: tongue midline. No signs of tongue biting or fasciculations   Motor exam: Strength is 5/5 in all extremities. Tone is normal. No abnormal movements were seen on exam.   Sensory exam reveals normal sense of light touch in all extremities.   Deep tendon reflexes:  2+ throughout.    Coordination: shows a normal finger-nose-finger. Normal rapidly alternating movements.   Gait: The patient was able to get up from seated position on first attempt without requiring assistance. Found to be steady when walking. Movements were fluid with normal arm swing. The patient was able to turn without difficulties or tendency to fall. Romberg exam mildly swaying      ANCILLARY DATA REVIEWED:       Lab Data Review:  Reviewed in chart.  CBC, CMP, hemoglobin A1c, lipid, GFR    Records reviewed:   Reviewed in chart.    Imaging:   MRI brain 3/15/2018  1.  Mild cerebral atrophy.  2.  Otherwise unremarkable MRI scan of the brain without contrast.    EEG:  EMU 11/26-30/2018 -Dr. Mooney  This is abnormal intensive video EEG recording in the awake and   drowsy/sleep state(s) From 11/26/2018 to 11/30/2018  This scalp EEG denotes    1. Nonspecific nonlocalizing cortical dysfunction /   irratiblity --spells of prolonged theta bursts -- Probable   frontal lobe seizures-- the focus is not localizing well    Routine EEG 3/15/2018-Dr. Roman  INTERPRETATION:  This is a normal video EEG recording in the awake and drowsy state(s).  Clinical correlation is recommended.      ASSESSMENT AND PLAN:    1. Localization-related epilepsy (HCC)      2.  Screening for depression    3. Mood disorder (HCC)          CLINICAL DISCUSSION:  Epileptic versus nonepileptic spells.  Spells of unable to see anything with aphasia, but able to hear his surroundings started in 2014 after a head trauma. There were also times that he passed out during these spells.  Spells can last up to 5-15 minutes? (Not typical for epileptic spells).  He feels hot and sweaty prior to spells.  No tongue biting or incontinence. He has tried multiple ASM's in the past but still continues to have spells.  MRI brain nonlesional.  Had a normal EEG but his 5-day EMU stay in November 2018 with Dr. Mooney was abnormal but still needs better characterization.  No spells captured then.  Last spell was in November 2018. He is currently taking lamotrigine 100mg QHS as he misread the instructions. No side effects. No rash.      Mother has epilepsy.  patient has a  higher risk of having seizures because of this     Patient has bipolar.  He is seeing a therapist. no suicidal or homicidal thoughts.      Past AED's: Keppra, Dilantin and Depakote (remors, tired, drowsy)     Current AED's:  Lamotrigine 100mg QHS        Plan:  -Pt is only taking lamotrigine 100mg QHS instead of BID. Will slowly increase dose to 100mg BID: Instructions given:     Lamotrigine: Continue with 4 tabs nightly. Then start taking 1 tab daily in am for 1 week, then 2 tabs daily in am for another week, then 3 tabs daily in am for another week then 4 tabs daily in am thereafter to reach goal of 4 tabs twice a day.     -Patient wants additional work-up if he does have another spell in the future.     - Discussed avoidance of spell/sz triggers: alcohol, sleep deprivation, and stress.     - Discussed Vit D supplementation. Recommended taking 2000-5000u daily.     - Discussed driving restrictions. Okay to drive but aware to stop driving immediately if a spell occurs and report to us.       -Labs to be checked for next appointment: Vit  D        FOLLOW-UP:   Return in about 3 months (around 1/22/2020).           EDUCATION AND COUNSELING:  -Education was provided to the patient and/or family regarding diagnosis and prognosis. The chronic and unpredictable nature of the condition were discussed. There is increased risk for additional events, which may carry potential for significant injuries and death. Discussed frequent seizure triggers: sleep deprivation, medication non-compliance, use of illegal drugs/alcohol, stress, and others.   -We reviewed in detail the current antiepileptic regimen. Potential side effects of antiepileptics were discussed at length, including but no limited to: hypersensitivity reactions (rash and others, some of which can be fatal), visual field changes (some of which may be irreversible), glaucoma, diplopia, kidney stones, osteopenia/osteoporosis/bone fractures, hyperthermia/anhydrosis, hyponatremia, tremors/abnormal movements, ataxia, dizziness, fatigue, increased risk for falls, risk for cardiac arrhythmias/syncope, gastrointestinal side effects(hepatitis, pancreatitis, gastritis, ulcers), gingival hypertrophy/bleeding, drowsiness, sedation, anxiety/nervousness, increased risk for suicide, increased risk for depression, and psychosis.   -We also reviewed drug-drug interactions and their potential effect on seizure control and medication side effects.    -Recommend chronic vitamin D supplementation and regular exercise (if not contraindicated).   -Patient/family educated on risk for SUDEP (Sudden Death in Epilepsy). Counseling was provided on the importance of strict medication and follow up compliance. The patient/family understand the risks associated with non-adherence with the medical plan as outlined, including but not limited to an increased risk for breakthrough seizures, which may contribute to injuries, disability, status epilepticus, and even death.   -Counseling was also provided on potential effects of alcohol  and other drugs, which may lower seizure threshold and/or affect the metabolism of antiepileptic drugs. We recommend avoidance of alcohol and illegal drugs.  -Avoid sleep deprivation.   -We extensively discussed the aspects related to safety in drivers who suffer from epilepsy. The patient is encourage to report to the Division of Motor Vehicles of any condition and/or spells related to confusion, disorientation, and/or loss of awareness and/or loss of consciousness; as these may pose a safety issue if they occur while operating a motor vehicle. The patient and/or family are ultimately responsible for exercising caution and abiding to regulations in place.   -Other seizure precautions were discussed at length, including no diving, no skydiving, no climbing or exposure to unprotected heights, no unsupervised swimming, no Jacuzzi or bathing in bathtubs or deep bodies of water. The patient/family have been advised about risks for operating any machinery while suffering from seizures / syncope / epilepsy and/or while taking antiepileptic drugs.   -The patient understands and agrees that due to the complexity of his/her diagnosis, results of any testing and further recommendations will typically be discussed/made during a face to face encounter in my office. The patient and/or family further understands it is their responsibility to keep proper follow up.     Patient/family agree with plan, as outlined.         Radha Cronin, MSN, APRN, FNP-C  [facility]  Office: [phone]  Fax: [phone]

## 2019-10-22 NOTE — PATIENT INSTRUCTIONS
Lamotrigine: Continue with 4 tabs nightly. Then start taking 1 tab daily in am for 1 week, then 2 tabs daily in am for another week, then 3 tabs daily in am for another week then 4 tabs daily in am thereafter to reach goal of 4 tabs twice a day.

## 2019-10-23 LAB — 25(OH)D3 SERPL-MCNC: 36 NG/ML (ref 30–100)

## 2021-11-12 ENCOUNTER — HOSPITAL ENCOUNTER (EMERGENCY)
Facility: MEDICAL CENTER | Age: 51
End: 2021-11-12
Attending: EMERGENCY MEDICINE
Payer: COMMERCIAL

## 2021-11-12 VITALS
HEART RATE: 60 BPM | HEIGHT: 75 IN | TEMPERATURE: 97 F | BODY MASS INDEX: 21.19 KG/M2 | DIASTOLIC BLOOD PRESSURE: 88 MMHG | RESPIRATION RATE: 17 BRPM | WEIGHT: 170.42 LBS | SYSTOLIC BLOOD PRESSURE: 149 MMHG | OXYGEN SATURATION: 97 %

## 2021-11-12 DIAGNOSIS — K04.7 DENTAL INFECTION: ICD-10-CM

## 2021-11-12 PROCEDURE — 99282 EMERGENCY DEPT VISIT SF MDM: CPT

## 2021-11-12 PROCEDURE — 41800 DRAINAGE OF GUM LESION: CPT

## 2021-11-12 PROCEDURE — 10160 PNXR ASPIR ABSC HMTMA BULLA: CPT

## 2021-11-12 PROCEDURE — 306637 HCHG MISC ORTHO ITEM RC 0274

## 2021-11-12 RX ORDER — LIDOCAINE HYDROCHLORIDE AND EPINEPHRINE 10; 10 MG/ML; UG/ML
10 INJECTION, SOLUTION INFILTRATION; PERINEURAL ONCE
Status: DISCONTINUED | OUTPATIENT
Start: 2021-11-12 | End: 2021-11-12 | Stop reason: HOSPADM

## 2021-11-12 RX ORDER — HYDROCODONE BITARTRATE AND ACETAMINOPHEN 5; 325 MG/1; MG/1
1 TABLET ORAL EVERY 6 HOURS PRN
Qty: 12 TABLET | Refills: 0 | Status: SHIPPED | OUTPATIENT
Start: 2021-11-12 | End: 2021-11-15

## 2021-11-12 NOTE — ED TRIAGE NOTES
"Chief Complaint   Patient presents with   • Dental Pain     pt reports dental abscess, has been taking abx x 3 days and he reports that it is getting worse.      /94   Pulse 61   Temp 36.1 °C (96.9 °F) (Temporal)   Resp 18   Ht 1.905 m (6' 3\")   Wt 77.3 kg (170 lb 6.7 oz)   SpO2 96%   Pt informed of wait times. Educated on triage process.  Asked to return to triage RN for any new or worsening of symptoms. Thanked for patience.        "

## 2021-11-13 NOTE — ED NOTES
VSS.  Discharge instructions and prescriptions x 1 given, Instructed to not drink alcohol, drive, or take tylenol while taking narcotic pain medication.  - verbalizes understanding.   Ambulatory to lobby with steady gait.

## 2021-11-13 NOTE — ED PROVIDER NOTES
ED Provider Note    ER PROVIDER NOTE      CHIEF COMPLAINT  Chief Complaint   Patient presents with   • Dental Pain     pt reports dental abscess, has been taking abx x 3 days and he reports that it is getting worse.        HPI  Andre Briceño is a 51 y.o. male who presents to the emergency department complaining of dental pain and swelling.  Patient reports he has had symptoms over the last 3 days, was seen at urgent care and started on antibiotics but the swelling seems to be getting worse.  He reports no fevers or chills.  No difficulty breathing or swallowing.  No other concerns.    REVIEW OF SYSTEMS  Pertinent positives include dental pain, swelling. Pertinent negatives include no fever. See HPI for details. All other systems reviewed and are negative.    PAST MEDICAL HISTORY   has a past medical history of Bipolar 1 disorder (HCC), Bronchitis, Pain, Psychiatric disorder (6/5/2018), and Seizure disorder ([facility]), and H/O Bipolar.  (6/5/2018).    SOCIAL HISTORY  Social History     Tobacco Use   • Smoking status: Current Every Day Smoker     Packs/day: 1.00     Years: 20.00     Pack years: 20.00     Types: Cigarettes   • Smokeless tobacco: Never Used   • Tobacco comment: Quit 5/2018   Substance Use Topics   • Alcohol use: Yes     Comment: occ   • Drug use: Yes     Types: Inhaled     Comment: marijuana       SURGICAL HISTORY   has a past surgical history that includes hand surgery; meniscus repair (12/10/2014); tonsillectomy and adenoidectomy; finger orif; wrist orif; submandible abscess incision and drainage (Right, 6/5/2018); and dental extraction(s) (6/5/2018).    CURRENT MEDICATIONS  Home Medications     Reviewed by Conchita Abdalla R.N. (Registered Nurse) on 11/12/21 at 1533  Med List Status: Partial   Medication Last Dose Status   gabapentin (NEURONTIN) 300 MG Cap  Active   lamoTRIgine (LAMICTAL) 25 MG Tab  Active   naltrexone (DEPADE) 50 MG Tab  Active   quetiapine (SEROQUEL) 400 MG tablet  Active  "  quetiapine (SEROQUEL) 50 MG tablet  Active   traZODone (DESYREL) 100 MG Tab  Active   zolpidem (AMBIEN) 10 MG Tab  Active                ALLERGIES  No Known Allergies    PHYSICAL EXAM  VITAL SIGNS: /88   Pulse 60   Temp 36.1 °C (97 °F)   Resp 17   Ht 1.905 m (6' 3\")   Wt 77.3 kg (170 lb 6.7 oz)   SpO2 97%   BMI 21.30 kg/m²   Pulse ox interpretation: I interpret this pulse ox as normal.    Constitutional: Alert.  In no apparent distress.  HENT: Normocephalic, Atraumatic, Bilateral external ears normal. Nose normal.  There is no trismus, few areas of dental decay, crowns in place, there is approximately a 0.5 cm area of fluctuance just posterior to the medial central incisor on the left upper, no other abnormal swelling, no submandibular fullness or tenderness  Eyes: Pupils are equal and reactive. Conjunctiva normal, non-icteric.   Heart: Regular rate and rhythm, no murmurs.    Lungs: Clear to auscultation bilaterally.  Skin: Warm, Dry, No erythema, No rash.   Musculoskeletal: No tenderness or major deformities noted. No edema.  Neurologic: Alert, Grossly non-focal.   Psychiatric: Affect normal, Judgment normal, Mood normal, Appears appropriate    DIAGNOSTIC STUDIES / PROCEDURES      RADIOLOGY  No orders to display     The radiologist's interpretation of all radiological studies have been reviewed and independently viewed by me.    COURSE & MEDICAL DECISION MAKING  Nursing notes, VS, PMSFHx reviewed in chart.    4:05 PM - Patient seen and examined at bedside.  We will plan for I&D    Procedure, incision and drainage.  Consent obtained from the patient.  Anesthesia with 1% lidocaine with epinephrine, 1 cc.  Patient is placed on appropriate position, anesthesia obtained as above, over the area of greatest fluctuance an 18-gauge needle is inserted, 1 cc of pus is able to be aspirated, patient tolerated well.        Decision Making:  This is a 51 y.o. male presented with dental pain and swelling.  Patient " did have the apparent abscess that was drained as above.  He is already started taking antibiotics and will continue these.  I have additionally ordered norco for pain.  There is no evidence of systemic illness or sepsis.  Patient will follow up with dental for definitive care    The patient will return for new or worsening symptoms and is stable at the time of discharge.    The patient is referred to a primary physician for blood pressure management, diabetic screening, and for all other preventative health concerns.    In prescribing controlled substances to this patient, I certify that I have obtained and reviewed the medical history of Andre Briceño. I have also made a good flynn effort to obtain applicable records from other providers who have treated the patient and records did not demonstrate any increased risk of substance abuse that would prevent me from prescribing controlled substances.     I have conducted a physical exam and documented it. I have reviewed Mr. Briceño’s prescription history as maintained by the [place] Prescription Monitoring Program.     I have assessed the patient’s risk for abuse, dependency, and addiction using the validated Opioid Risk Tool available at https://www.mdcalc.com/aweyji-bjvl-bacq-ort-narcotic-abuse.     Given the above, I believe the benefits of controlled substance therapy outweigh the risks. The reasons for prescribing controlled substances include non-narcotic, oral analgesic alternatives have been inadequate for pain control. Accordingly, I have discussed the risk and benefits, treatment plan, and alternative therapies with the patient.         DISPOSITION:  Patient will be discharged home in stable condition.    FOLLOW UP:  with your dentist            OUTPATIENT MEDICATIONS:  New Prescriptions    HYDROCODONE-ACETAMINOPHEN (NORCO) 5-325 MG TAB PER TABLET    Take 1 Tablet by mouth every 6 hours as needed (pain) for up to 3 days.         FINAL IMPRESSION  1. Dental  infection        The note accurately reflects work and decisions made by me.  Severo Clinton M.D.  11/12/2021  5:05 PM

## 2021-11-13 NOTE — DISCHARGE INSTRUCTIONS
Please continue to take your antibiotics as directed, additionally a prescription for pain medication has been sent to the pharmacy.  Please follow-up with your dentist or oral surgeon as we discussed

## (undated) DEVICE — TUBE, CULTURE AEROBIC

## (undated) DEVICE — GAUZE FLUFF STERILE 2-PLY 36 X 36 (100EA/CA)

## (undated) DEVICE — BLADE SURGICAL #15 - (50/BX 3BX/CA)

## (undated) DEVICE — CANISTER SUCTION 3000ML MECHANICAL FILTER AUTO SHUTOFF MEDI-VAC NONSTERILE LF DISP  (40EA/CA)

## (undated) DEVICE — KIT ROOM DECONTAMINATION

## (undated) DEVICE — SUTURE GENERAL

## (undated) DEVICE — BANDAGE ROLL STERILE BULKEE 4.5 IN X 4 YD (100EA/CA)

## (undated) DEVICE — TRAY SRGPRP PVP IOD WT PRP - (20/CA)

## (undated) DEVICE — GLOVE SZ 7 BIOGEL PI MICRO - PF LF (50PR/BX 4BX/CA)

## (undated) DEVICE — DRAPE SURGICAL U 77X120 - (10/CA)

## (undated) DEVICE — DRAIN PENROSE STERILE 1/4 X - 18 IN  (25EA/BX)

## (undated) DEVICE — HEMOSTAT SURG ABSORBABLE - 4 X 8 IN SURGICEL (24EA/CA)

## (undated) DEVICE — PROTECTOR ULNA NERVE - (36PR/CA)

## (undated) DEVICE — TUBING CLEARLINK DUO-VENT - C-FLO (48EA/CA)

## (undated) DEVICE — SODIUM CHL IRRIGATION 0.9% 1000ML (12EA/CA)

## (undated) DEVICE — SUCTION INSTRUMENT YANKAUER BULBOUS TIP W/O VENT (50EA/CA)

## (undated) DEVICE — JAW BRA #93

## (undated) DEVICE — DRESSING ABDOMINAL PAD STERILE 8 X 10" (360EA/CA)"

## (undated) DEVICE — SYRINGE 30 ML LL (56/BX)

## (undated) DEVICE — SUTURE 3-0 SILK FS 18 INCH - (12PK/BX)

## (undated) DEVICE — DRAPE MAGNETIC (INSTRA-MAG) - (30/CA)

## (undated) DEVICE — SYRINGE 10 ML CONTROL LL (25EA/BX 4BX/CA)

## (undated) DEVICE — LACTATED RINGERS INJ 1000 ML - (14EA/CA 60CA/PF)

## (undated) DEVICE — GOWN SURGEONS X-LARGE - DISP. (30/CA)

## (undated) DEVICE — PEROXIDE HYDROGEN 3% 32 OZ. (12EA/BX)

## (undated) DEVICE — GLOVE BIOGEL PI ORTHO SZ 8 PF LF (40PR/BX)

## (undated) DEVICE — SET EXTENSION WITH 2 PORTS (48EA/CA) ***PART #2C8610 IS A SUBSTITUTE*****

## (undated) DEVICE — GLOVE BIOGEL ECLIPSE  PF LATEX SIZE 6.5 (50PR/BX)

## (undated) DEVICE — ELECTRODE 850 FOAM ADHESIVE - HYDROGEL RADIOTRNSPRNT (50/PK)

## (undated) DEVICE — SYRINGE EAR/NOSE 3 OZ STERILE (50/CA

## (undated) DEVICE — HEAD HOLDER JUNIOR/ADULT

## (undated) DEVICE — KIT ANESTHESIA W/CIRCUIT & 3/LT BAG W/FILTER (20EA/CA)

## (undated) DEVICE — ELECTRODE DUAL RETURN W/ CORD - (50/PK)

## (undated) DEVICE — NEEDLE NON SAFETY HYPO 22 GA X 1 1/2 IN (100/BX)

## (undated) DEVICE — PACK MINOR BASIN - (2EA/CA)

## (undated) DEVICE — SENSOR SPO2 NEO LNCS ADHESIVE (20/BX) SEE USER NOTES

## (undated) DEVICE — NEEDLE NON SAFETY 25 GA X 1 1/2 IN HYPO (100EA/BX)

## (undated) DEVICE — SET LEADWIRE 5 LEAD BEDSIDE DISPOSABLE ECG (1SET OF 5/EA)

## (undated) DEVICE — GOWN WARMING STANDARD FLEX - (30/CA)

## (undated) DEVICE — BOVIE NEEDLE TIP INSULATD NON-SAFETY 2CM (50/PK)

## (undated) DEVICE — PAD BABY LAP 4X18 W/O - RINGS PREWASHED 5/PK 40PK/CS

## (undated) DEVICE — MASK ANESTHESIA ADULT  - (100/CA)

## (undated) DEVICE — CATHETER IV 14 GA X 2 ---SURG.& SDS ONLY---(200EA/CA)

## (undated) DEVICE — SWAB ANAEROBIC SPEC.COLLECTOR - (25/PK 4PK/CA 100EA/CA)

## (undated) DEVICE — NEPTUNE 4 PORT MANIFOLD - (20/PK)